# Patient Record
Sex: FEMALE | Race: ASIAN | NOT HISPANIC OR LATINO
[De-identification: names, ages, dates, MRNs, and addresses within clinical notes are randomized per-mention and may not be internally consistent; named-entity substitution may affect disease eponyms.]

---

## 2017-01-17 ENCOUNTER — RESULT REVIEW (OUTPATIENT)
Age: 52
End: 2017-01-17

## 2017-05-15 ENCOUNTER — APPOINTMENT (OUTPATIENT)
Dept: INFECTIOUS DISEASE | Facility: CLINIC | Age: 52
End: 2017-05-15

## 2023-03-11 ENCOUNTER — EMERGENCY (EMERGENCY)
Facility: HOSPITAL | Age: 58
LOS: 1 days | Discharge: ANOTHER HOSPITAL | End: 2023-03-11
Attending: EMERGENCY MEDICINE | Admitting: EMERGENCY MEDICINE
Payer: COMMERCIAL

## 2023-03-11 VITALS
SYSTOLIC BLOOD PRESSURE: 85 MMHG | HEART RATE: 56 BPM | RESPIRATION RATE: 15 BRPM | OXYGEN SATURATION: 100 % | DIASTOLIC BLOOD PRESSURE: 57 MMHG | TEMPERATURE: 98 F

## 2023-03-11 PROCEDURE — 99285 EMERGENCY DEPT VISIT HI MDM: CPT

## 2023-03-11 NOTE — ED ADULT TRIAGE NOTE - CHIEF COMPLAINT QUOTE
presents C/O fall downstairs approximately 5 stairs. + head strike. denies LOC + deformities right wrist. also endorsing pain to right upper quadrant. denies AC use. no Pmhx

## 2023-03-12 ENCOUNTER — INPATIENT (INPATIENT)
Facility: HOSPITAL | Age: 58
LOS: 0 days | Discharge: HOME | DRG: 86 | End: 2023-03-13
Attending: SURGERY | Admitting: SURGERY
Payer: COMMERCIAL

## 2023-03-12 VITALS
DIASTOLIC BLOOD PRESSURE: 64 MMHG | OXYGEN SATURATION: 97 % | HEART RATE: 71 BPM | RESPIRATION RATE: 17 BRPM | SYSTOLIC BLOOD PRESSURE: 104 MMHG

## 2023-03-12 VITALS
WEIGHT: 132.28 LBS | RESPIRATION RATE: 16 BRPM | HEIGHT: 65 IN | HEART RATE: 66 BPM | TEMPERATURE: 98 F | DIASTOLIC BLOOD PRESSURE: 55 MMHG | SYSTOLIC BLOOD PRESSURE: 105 MMHG | OXYGEN SATURATION: 98 %

## 2023-03-12 DIAGNOSIS — S42.309A UNSPECIFIED FRACTURE OF SHAFT OF HUMERUS, UNSPECIFIED ARM, INITIAL ENCOUNTER FOR CLOSED FRACTURE: ICD-10-CM

## 2023-03-12 LAB
ALBUMIN SERPL ELPH-MCNC: 3.8 G/DL — SIGNIFICANT CHANGE UP (ref 3.3–5)
ALP SERPL-CCNC: 56 U/L — SIGNIFICANT CHANGE UP (ref 40–120)
ALT FLD-CCNC: 16 U/L — SIGNIFICANT CHANGE UP (ref 4–33)
ANION GAP SERPL CALC-SCNC: 16 MMOL/L — HIGH (ref 7–14)
ANION GAP SERPL CALC-SCNC: 9 MMOL/L — SIGNIFICANT CHANGE UP (ref 5–17)
APPEARANCE UR: CLEAR — SIGNIFICANT CHANGE UP
APTT BLD: 24.4 SEC — LOW (ref 27–36.3)
AST SERPL-CCNC: 24 U/L — SIGNIFICANT CHANGE UP (ref 4–32)
BACTERIA # UR AUTO: NEGATIVE — SIGNIFICANT CHANGE UP
BASE EXCESS BLDV CALC-SCNC: -3.6 MMOL/L — LOW (ref -2–3)
BASOPHILS # BLD AUTO: 0.04 K/UL — SIGNIFICANT CHANGE UP (ref 0–0.2)
BASOPHILS NFR BLD AUTO: 0.3 % — SIGNIFICANT CHANGE UP (ref 0–2)
BILIRUB SERPL-MCNC: 0.2 MG/DL — SIGNIFICANT CHANGE UP (ref 0.2–1.2)
BILIRUB UR-MCNC: NEGATIVE — SIGNIFICANT CHANGE UP
BLD GP AB SCN SERPL QL: NEGATIVE — SIGNIFICANT CHANGE UP
BLOOD GAS VENOUS COMPREHENSIVE RESULT: SIGNIFICANT CHANGE UP
BUN SERPL-MCNC: 13 MG/DL — SIGNIFICANT CHANGE UP (ref 7–23)
BUN SERPL-MCNC: 8 MG/DL — SIGNIFICANT CHANGE UP (ref 7–23)
CALCIUM SERPL-MCNC: 8.4 MG/DL — SIGNIFICANT CHANGE UP (ref 8.4–10.5)
CALCIUM SERPL-MCNC: 8.8 MG/DL — SIGNIFICANT CHANGE UP (ref 8.4–10.5)
CHLORIDE BLDV-SCNC: 107 MMOL/L — SIGNIFICANT CHANGE UP (ref 96–108)
CHLORIDE SERPL-SCNC: 107 MMOL/L — SIGNIFICANT CHANGE UP (ref 98–107)
CHLORIDE SERPL-SCNC: 108 MMOL/L — SIGNIFICANT CHANGE UP (ref 96–108)
CK SERPL-CCNC: 201 U/L — HIGH (ref 25–170)
CO2 BLDV-SCNC: 24.1 MMOL/L — SIGNIFICANT CHANGE UP (ref 22–26)
CO2 SERPL-SCNC: 17 MMOL/L — LOW (ref 22–31)
CO2 SERPL-SCNC: 23 MMOL/L — SIGNIFICANT CHANGE UP (ref 22–31)
COLOR SPEC: SIGNIFICANT CHANGE UP
CREAT SERPL-MCNC: 0.62 MG/DL — SIGNIFICANT CHANGE UP (ref 0.5–1.3)
CREAT SERPL-MCNC: 0.66 MG/DL — SIGNIFICANT CHANGE UP (ref 0.5–1.3)
DIFF PNL FLD: NEGATIVE — SIGNIFICANT CHANGE UP
EGFR: 103 ML/MIN/1.73M2 — SIGNIFICANT CHANGE UP
EGFR: 104 ML/MIN/1.73M2 — SIGNIFICANT CHANGE UP
EOSINOPHIL # BLD AUTO: 0.05 K/UL — SIGNIFICANT CHANGE UP (ref 0–0.5)
EOSINOPHIL NFR BLD AUTO: 0.4 % — SIGNIFICANT CHANGE UP (ref 0–6)
EPI CELLS # UR: 2 /HPF — SIGNIFICANT CHANGE UP
FLUAV AG NPH QL: SIGNIFICANT CHANGE UP
FLUBV AG NPH QL: SIGNIFICANT CHANGE UP
GAS PNL BLDV: 137 MMOL/L — SIGNIFICANT CHANGE UP (ref 136–145)
GLUCOSE BLDV-MCNC: 131 MG/DL — HIGH (ref 70–99)
GLUCOSE SERPL-MCNC: 146 MG/DL — HIGH (ref 70–99)
GLUCOSE SERPL-MCNC: 94 MG/DL — SIGNIFICANT CHANGE UP (ref 70–99)
GLUCOSE UR QL: NEGATIVE — SIGNIFICANT CHANGE UP
HCG SERPL-ACNC: <5 MIU/ML — SIGNIFICANT CHANGE UP
HCO3 BLDV-SCNC: 23 MMOL/L — SIGNIFICANT CHANGE UP (ref 22–29)
HCT VFR BLD CALC: 32.7 % — LOW (ref 34.5–45)
HCT VFR BLD CALC: 37.2 % — SIGNIFICANT CHANGE UP (ref 34.5–45)
HCT VFR BLDA CALC: 34 % — LOW (ref 34.5–46.5)
HGB BLD CALC-MCNC: 11.2 G/DL — LOW (ref 11.7–16.1)
HGB BLD-MCNC: 10.3 G/DL — LOW (ref 11.5–15.5)
HGB BLD-MCNC: 11.1 G/DL — LOW (ref 11.5–15.5)
HYALINE CASTS # UR AUTO: 3 /LPF — HIGH (ref 0–2)
IANC: 9.69 K/UL — HIGH (ref 1.8–7.4)
IMM GRANULOCYTES NFR BLD AUTO: 0.4 % — SIGNIFICANT CHANGE UP (ref 0–0.9)
INR BLD: 1.06 RATIO — SIGNIFICANT CHANGE UP (ref 0.88–1.16)
KETONES UR-MCNC: NEGATIVE — SIGNIFICANT CHANGE UP
LACTATE BLDV-MCNC: 2.2 MMOL/L — HIGH (ref 0.5–2)
LEUKOCYTE ESTERASE UR-ACNC: NEGATIVE — SIGNIFICANT CHANGE UP
LIDOCAIN IGE QN: 47 U/L — SIGNIFICANT CHANGE UP (ref 7–60)
LYMPHOCYTES # BLD AUTO: 1.37 K/UL — SIGNIFICANT CHANGE UP (ref 1–3.3)
LYMPHOCYTES # BLD AUTO: 11.5 % — LOW (ref 13–44)
MAGNESIUM SERPL-MCNC: 2 MG/DL — SIGNIFICANT CHANGE UP (ref 1.6–2.6)
MCHC RBC-ENTMCNC: 24.9 PG — LOW (ref 27–34)
MCHC RBC-ENTMCNC: 26.4 PG — LOW (ref 27–34)
MCHC RBC-ENTMCNC: 29.8 GM/DL — LOW (ref 32–36)
MCHC RBC-ENTMCNC: 31.5 GM/DL — LOW (ref 32–36)
MCV RBC AUTO: 83.6 FL — SIGNIFICANT CHANGE UP (ref 80–100)
MCV RBC AUTO: 83.8 FL — SIGNIFICANT CHANGE UP (ref 80–100)
MONOCYTES # BLD AUTO: 0.68 K/UL — SIGNIFICANT CHANGE UP (ref 0–0.9)
MONOCYTES NFR BLD AUTO: 5.7 % — SIGNIFICANT CHANGE UP (ref 2–14)
NEUTROPHILS # BLD AUTO: 9.69 K/UL — HIGH (ref 1.8–7.4)
NEUTROPHILS NFR BLD AUTO: 81.7 % — HIGH (ref 43–77)
NITRITE UR-MCNC: NEGATIVE — SIGNIFICANT CHANGE UP
NRBC # BLD: 0 /100 WBCS — SIGNIFICANT CHANGE UP (ref 0–0)
NRBC # BLD: 0 /100 WBCS — SIGNIFICANT CHANGE UP (ref 0–0)
NRBC # FLD: 0 K/UL — SIGNIFICANT CHANGE UP (ref 0–0)
PCO2 BLDV: 45 MMHG — SIGNIFICANT CHANGE UP (ref 39–52)
PH BLDV: 7.31 — LOW (ref 7.32–7.43)
PH UR: 6.5 — SIGNIFICANT CHANGE UP (ref 5–8)
PHOSPHATE SERPL-MCNC: 3.1 MG/DL — SIGNIFICANT CHANGE UP (ref 2.5–4.5)
PLATELET # BLD AUTO: 275 K/UL — SIGNIFICANT CHANGE UP (ref 150–400)
PLATELET # BLD AUTO: 333 K/UL — SIGNIFICANT CHANGE UP (ref 150–400)
PO2 BLDV: 37 MMHG — SIGNIFICANT CHANGE UP (ref 25–45)
POTASSIUM BLDV-SCNC: 4 MMOL/L — SIGNIFICANT CHANGE UP (ref 3.5–5.1)
POTASSIUM SERPL-MCNC: 3.8 MMOL/L — SIGNIFICANT CHANGE UP (ref 3.5–5.3)
POTASSIUM SERPL-MCNC: 3.9 MMOL/L — SIGNIFICANT CHANGE UP (ref 3.5–5.3)
POTASSIUM SERPL-SCNC: 3.8 MMOL/L — SIGNIFICANT CHANGE UP (ref 3.5–5.3)
POTASSIUM SERPL-SCNC: 3.9 MMOL/L — SIGNIFICANT CHANGE UP (ref 3.5–5.3)
PROT SERPL-MCNC: 6.9 G/DL — SIGNIFICANT CHANGE UP (ref 6–8.3)
PROT UR-MCNC: ABNORMAL
PROTHROM AB SERPL-ACNC: 12.3 SEC — SIGNIFICANT CHANGE UP (ref 10.5–13.4)
RBC # BLD: 3.9 M/UL — SIGNIFICANT CHANGE UP (ref 3.8–5.2)
RBC # BLD: 4.45 M/UL — SIGNIFICANT CHANGE UP (ref 3.8–5.2)
RBC # FLD: 13.5 % — SIGNIFICANT CHANGE UP (ref 10.3–14.5)
RBC # FLD: 13.7 % — SIGNIFICANT CHANGE UP (ref 10.3–14.5)
RBC CASTS # UR COMP ASSIST: 2 /HPF — SIGNIFICANT CHANGE UP (ref 0–4)
RH IG SCN BLD-IMP: NEGATIVE — SIGNIFICANT CHANGE UP
RSV RNA NPH QL NAA+NON-PROBE: SIGNIFICANT CHANGE UP
SAO2 % BLDV: 65.6 % — LOW (ref 67–88)
SARS-COV-2 RNA SPEC QL NAA+PROBE: SIGNIFICANT CHANGE UP
SODIUM SERPL-SCNC: 140 MMOL/L — SIGNIFICANT CHANGE UP (ref 135–145)
SODIUM SERPL-SCNC: 140 MMOL/L — SIGNIFICANT CHANGE UP (ref 135–145)
SP GR SPEC: 1.05 — HIGH (ref 1.01–1.02)
TROPONIN T, HIGH SENSITIVITY RESULT: <6 NG/L — SIGNIFICANT CHANGE UP (ref 0–51)
UROBILINOGEN FLD QL: NEGATIVE — SIGNIFICANT CHANGE UP
WBC # BLD: 11.88 K/UL — HIGH (ref 3.8–10.5)
WBC # BLD: 7 K/UL — SIGNIFICANT CHANGE UP (ref 3.8–10.5)
WBC # FLD AUTO: 11.88 K/UL — HIGH (ref 3.8–10.5)
WBC # FLD AUTO: 7 K/UL — SIGNIFICANT CHANGE UP (ref 3.8–10.5)
WBC UR QL: 2 /HPF — SIGNIFICANT CHANGE UP (ref 0–5)

## 2023-03-12 PROCEDURE — 70450 CT HEAD/BRAIN W/O DYE: CPT | Mod: 26

## 2023-03-12 PROCEDURE — 73090 X-RAY EXAM OF FOREARM: CPT | Mod: 26,RT

## 2023-03-12 PROCEDURE — 70450 CT HEAD/BRAIN W/O DYE: CPT | Mod: 26,MA,77

## 2023-03-12 PROCEDURE — 72125 CT NECK SPINE W/O DYE: CPT | Mod: 26,MA

## 2023-03-12 PROCEDURE — 71045 X-RAY EXAM CHEST 1 VIEW: CPT | Mod: 26

## 2023-03-12 PROCEDURE — 73090 X-RAY EXAM OF FOREARM: CPT | Mod: 26,RT,77,76

## 2023-03-12 PROCEDURE — 74177 CT ABD & PELVIS W/CONTRAST: CPT | Mod: 26,MA

## 2023-03-12 PROCEDURE — 76377 3D RENDER W/INTRP POSTPROCES: CPT | Mod: 26

## 2023-03-12 PROCEDURE — 73030 X-RAY EXAM OF SHOULDER: CPT | Mod: 26,RT

## 2023-03-12 PROCEDURE — 99222 1ST HOSP IP/OBS MODERATE 55: CPT

## 2023-03-12 PROCEDURE — 70486 CT MAXILLOFACIAL W/O DYE: CPT | Mod: 26,MA

## 2023-03-12 PROCEDURE — 99282 EMERGENCY DEPT VISIT SF MDM: CPT

## 2023-03-12 PROCEDURE — 73110 X-RAY EXAM OF WRIST: CPT | Mod: 26,RT

## 2023-03-12 PROCEDURE — 71260 CT THORAX DX C+: CPT | Mod: 26,MA

## 2023-03-12 PROCEDURE — 73080 X-RAY EXAM OF ELBOW: CPT | Mod: 26,RT

## 2023-03-12 PROCEDURE — 99291 CRITICAL CARE FIRST HOUR: CPT | Mod: 25

## 2023-03-12 PROCEDURE — 69000 DRG XTRNL EAR ABSC/HEM SMPL: CPT

## 2023-03-12 RX ORDER — ACETAMINOPHEN 500 MG
975 TABLET ORAL EVERY 6 HOURS
Refills: 0 | Status: DISCONTINUED | OUTPATIENT
Start: 2023-03-12 | End: 2023-03-13

## 2023-03-12 RX ORDER — MORPHINE SULFATE 50 MG/1
4 CAPSULE, EXTENDED RELEASE ORAL ONCE
Refills: 0 | Status: DISCONTINUED | OUTPATIENT
Start: 2023-03-12 | End: 2023-03-12

## 2023-03-12 RX ORDER — SODIUM CHLORIDE 9 MG/ML
1000 INJECTION INTRAMUSCULAR; INTRAVENOUS; SUBCUTANEOUS ONCE
Refills: 0 | Status: COMPLETED | OUTPATIENT
Start: 2023-03-12 | End: 2023-03-12

## 2023-03-12 RX ORDER — HYDROMORPHONE HYDROCHLORIDE 2 MG/ML
0.25 INJECTION INTRAMUSCULAR; INTRAVENOUS; SUBCUTANEOUS EVERY 4 HOURS
Refills: 0 | Status: DISCONTINUED | OUTPATIENT
Start: 2023-03-12 | End: 2023-03-13

## 2023-03-12 RX ORDER — LIDOCAINE 4 G/100G
1 CREAM TOPICAL EVERY 24 HOURS
Refills: 0 | Status: DISCONTINUED | OUTPATIENT
Start: 2023-03-12 | End: 2023-03-13

## 2023-03-12 RX ORDER — OXYCODONE HYDROCHLORIDE 5 MG/1
5 TABLET ORAL EVERY 4 HOURS
Refills: 0 | Status: DISCONTINUED | OUTPATIENT
Start: 2023-03-12 | End: 2023-03-13

## 2023-03-12 RX ORDER — ACETAMINOPHEN 500 MG
1000 TABLET ORAL ONCE
Refills: 0 | Status: COMPLETED | OUTPATIENT
Start: 2023-03-12 | End: 2023-03-12

## 2023-03-12 RX ORDER — TRAZODONE HCL 50 MG
50 TABLET ORAL AT BEDTIME
Refills: 0 | Status: DISCONTINUED | OUTPATIENT
Start: 2023-03-12 | End: 2023-03-13

## 2023-03-12 RX ORDER — OXYCODONE HYDROCHLORIDE 5 MG/1
2.5 TABLET ORAL EVERY 4 HOURS
Refills: 0 | Status: DISCONTINUED | OUTPATIENT
Start: 2023-03-12 | End: 2023-03-13

## 2023-03-12 RX ORDER — SENNA PLUS 8.6 MG/1
2 TABLET ORAL AT BEDTIME
Refills: 0 | Status: DISCONTINUED | OUTPATIENT
Start: 2023-03-12 | End: 2023-03-13

## 2023-03-12 RX ORDER — POLYETHYLENE GLYCOL 3350 17 G/17G
17 POWDER, FOR SOLUTION ORAL EVERY 24 HOURS
Refills: 0 | Status: DISCONTINUED | OUTPATIENT
Start: 2023-03-12 | End: 2023-03-13

## 2023-03-12 RX ORDER — LIDOCAINE 4 G/100G
1 CREAM TOPICAL ONCE
Refills: 0 | Status: COMPLETED | OUTPATIENT
Start: 2023-03-12 | End: 2023-03-12

## 2023-03-12 RX ADMIN — OXYCODONE HYDROCHLORIDE 5 MILLIGRAM(S): 5 TABLET ORAL at 18:39

## 2023-03-12 RX ADMIN — OXYCODONE HYDROCHLORIDE 5 MILLIGRAM(S): 5 TABLET ORAL at 17:39

## 2023-03-12 RX ADMIN — SODIUM CHLORIDE 1000 MILLILITER(S): 9 INJECTION INTRAMUSCULAR; INTRAVENOUS; SUBCUTANEOUS at 10:26

## 2023-03-12 RX ADMIN — Medication 975 MILLIGRAM(S): at 18:39

## 2023-03-12 RX ADMIN — HYDROMORPHONE HYDROCHLORIDE 0.25 MILLIGRAM(S): 2 INJECTION INTRAMUSCULAR; INTRAVENOUS; SUBCUTANEOUS at 14:13

## 2023-03-12 RX ADMIN — MORPHINE SULFATE 4 MILLIGRAM(S): 50 CAPSULE, EXTENDED RELEASE ORAL at 06:08

## 2023-03-12 RX ADMIN — OXYCODONE HYDROCHLORIDE 5 MILLIGRAM(S): 5 TABLET ORAL at 21:58

## 2023-03-12 RX ADMIN — OXYCODONE HYDROCHLORIDE 5 MILLIGRAM(S): 5 TABLET ORAL at 22:58

## 2023-03-12 RX ADMIN — MORPHINE SULFATE 4 MILLIGRAM(S): 50 CAPSULE, EXTENDED RELEASE ORAL at 01:55

## 2023-03-12 RX ADMIN — LIDOCAINE 1 PATCH: 4 CREAM TOPICAL at 10:25

## 2023-03-12 RX ADMIN — Medication 400 MILLIGRAM(S): at 06:09

## 2023-03-12 RX ADMIN — Medication 50 MILLIGRAM(S): at 21:58

## 2023-03-12 RX ADMIN — HYDROMORPHONE HYDROCHLORIDE 0.25 MILLIGRAM(S): 2 INJECTION INTRAMUSCULAR; INTRAVENOUS; SUBCUTANEOUS at 14:28

## 2023-03-12 RX ADMIN — Medication 1000 MILLIGRAM(S): at 06:39

## 2023-03-12 RX ADMIN — Medication 975 MILLIGRAM(S): at 23:28

## 2023-03-12 RX ADMIN — Medication 975 MILLIGRAM(S): at 17:38

## 2023-03-12 RX ADMIN — LIDOCAINE 1 PATCH: 4 CREAM TOPICAL at 22:00

## 2023-03-12 RX ADMIN — Medication 1000 MILLIGRAM(S): at 07:33

## 2023-03-12 RX ADMIN — SENNA PLUS 2 TABLET(S): 8.6 TABLET ORAL at 17:38

## 2023-03-12 RX ADMIN — LIDOCAINE 1 PATCH: 4 CREAM TOPICAL at 19:30

## 2023-03-12 RX ADMIN — POLYETHYLENE GLYCOL 3350 17 GRAM(S): 17 POWDER, FOR SOLUTION ORAL at 17:40

## 2023-03-12 RX ADMIN — MORPHINE SULFATE 4 MILLIGRAM(S): 50 CAPSULE, EXTENDED RELEASE ORAL at 06:20

## 2023-03-12 NOTE — PHYSICAL THERAPY INITIAL EVALUATION ADULT - ACTIVE RANGE OF MOTION EXAMINATION, REHAB EVAL
R UE unable to formally assess due to pain, fingers WFL/Left UE Active ROM was WFL (within functional limits)/bilateral  lower extremity Active ROM was WFL (within functional limits)

## 2023-03-12 NOTE — ED PROVIDER NOTE - OBJECTIVE STATEMENT
57F presenting after fall down 5 stairs at home. Pt endorsing head trauma, pain to the right temporal area. PT also noting swelling and pain to the right wrist, and severe pain to the right lateral chest wall. Pt without significant pmh, is not on AC. Denying n/v, visual changes, weakness/numbness.

## 2023-03-12 NOTE — H&P ADULT - HISTORY OF PRESENT ILLNESS
57F no significant pmh presenting as tranasfer from [facility] after fall at home.    Patient reports she was walking downstairs and lost her footing at home, fella bout 5 steps on to her head/right side/wrist. She was down for seconds when she regained consciousness and was helped up by her . She denies any prodromal symptoms or losing consciousness prior to fall and remembers the episode. Due to pain and swelling in her right wrist she went with her  to the ED. No prior episodes of fall, does not take anticoagulation medications.    On arrival, primary intact, GCS 15, secondary significant for right posterior auricular/temporal tenderness and bruising, right wrist casted, right posterior chest wall tenderness, right hip tenderness. Trauma consulted for further evaluation and management.

## 2023-03-12 NOTE — ED ADULT TRIAGE NOTE - PRO INTERPRETER NEED 2
----- Message from Sarina Greco MD sent at 10/12/2017  4:19 PM CDT -----  Hey,    Sorry I didn't do this sooner.  I put an addendum in the pathology report now.  There are no fungal organisms.  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  No inflammation.  Nothing that looks like actinomyces which usually looks like cotton balls.  Maybe they were just present previously as normal oral contaminant?    Sarina  ----- Message -----  From: Bernard Daley MD  Sent: 9/25/2017   8:15 AM  To: Sarina Greco MD    Understood. I'd appreciate it. Thanks for help.  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  I am at [facility] the beginning half of this week so cannot pull the case right now but I will order a GMS stain to check for fungal organisms and issue an addendum.      Thanks,  Sarina Greco  ----- Message -----  From: Bernard Daley MD  Sent: 9/25/2017   7:59 AM  To: Sarina Greco MD    I just wanted to clarify, since the referring MD had some question here regarding laryngeal actinomycosis or some other infectious process based on prior biopsy/cultures: no evidence of any bacteria/fungi on your analysis, correct?  Thanks  JM        
English

## 2023-03-12 NOTE — ED ADULT NURSE NOTE - OBJECTIVE STATEMENT
56 y/o F no pertinent PMH presented to ED, transfer from [facility], s/p fall. Pt states she missed a step in her house and fell down a flight of stairs, positive head strike and LOC, denies anticoagulants. Per EMS, pt has rib fractures and right wrist fracture which was reduced at [facility]. Pt endorsing wrist and back pain, headaches at this time. Neuro in tact, full sensation in RUE, denies numbness or tingling in all extremities, distal pulses present and equal bilaterally. PERRL, strength equal in lower extremities and left upper extremity, weakness in side of fracture. A&Ox3, breathing spontaneously and unlabored, speaking full sentences, moving extremities. Appropriate safety measures in place, call bell within reach, family at bedside, pt is on CM. 56 y/o F no pertinent PMH presented to ED, transfer from [facility], s/p fall. Pt states she missed a step in her house and fell down a flight of stairs, positive head strike and LOC, denies anticoagulants. Per EMS, pt has rib fractures and right wrist fracture which was reduced at [facility]. Pt endorsing wrist and back pain, headaches at this time. Neuro in tact, full sensation in RUE, denies numbness or tingling in all extremities, distal pulses present and equal bilaterally. PERRL, strength equal in lower extremities and left upper extremity, weakness in side of fracture. Pt denies cp, sob, abd pain, vision changes at this time. A&Ox3, breathing spontaneously and unlabored, speaking full sentences, moving extremities. Appropriate safety measures in place, call bell within reach, family at bedside, pt is on CM.

## 2023-03-12 NOTE — H&P ADULT - ASSESSMENT
57F s/p fall from 5 steps with the following injuries, rib score 1 and 500 on IS:    - Right sphenoid fx with 2-3mm extra-axial hemorrhage  - R 4-6 minimally displaced rib fx  - Right gluteal hematoma  - Right distal radius fx s/p splinting by orthopedics    Plan:  - Admit to Dr. Correia, dispo pending repeat head CT  - Repeat head CT with max face to further characterize fracture and assess stability of bleed  - Neurosurgery consulted for management of intracranial hemorrhage, sphenoid fx  - Rib fx - protocol tylenol, oxy/dilaudid prn, lidocaine patch, holding NSAIDs in setting of bleed  - regular diet, bowel regimen  - home trazodone 50mg   - PT/OT for right wrist fx and ambulation after rib fx  - dvt ppx held in setting of bleed, to start 24 hr after stability  - report incidental finding of fibroids  - Tertiary on 3/13    Plan discussed with Dr. Masood Delgado  ATP  [id number]     57F s/p fall from 5 steps with the following injuries, rib score 1 and 500 on IS:    - Right sphenoid fx with 2-3mm extra-axial hemorrhage  - Non displaced right zygomatic fx  - R 4-6 minimally displaced rib fx  - Right gluteal hematoma  - Right distal radius fx s/p splinting by orthopedics    Plan:  - Admit to Dr. Correia, dispo pending repeat head CT  - Repeat head CT with max face to further characterize fracture and assess stability of bleed  - Neurosurgery consulted for management of intracranial hemorrhage, sphenoid fx, appreciate recs  - Face called for zygomatic fx, await call back  - Rib fx - protocol tylenol, oxy/dilaudid prn, lidocaine patch, holding NSAIDs in setting of bleed, may start tomorrow if head CT stable  - regular diet, bowel regimen  - home trazodone 50mg   - PT/OT for right wrist fx and ambulation after rib fx  - dvt ppx held in setting of bleed, to start 24 hr after stability 12 hours from initial CT  - report incidental finding of fibroids  - Tertiary on 3/13    Plan discussed with Dr. Masood Rojas [place]  ATP  [id number]

## 2023-03-12 NOTE — CONSULT NOTE ADULT - SUBJECTIVE AND OBJECTIVE BOX
57F no AC/AP, txfr from [facility] s/p mechanical fall downstairs last night with HS, LOC, found to have a small R sphenoid fx and reported tiny associated focus of heme. Also found to have rib fractures and R distal radial fx. Plts and coags are WNL.    --Anticoagulation--    T(C): 37.1 (03-12-23 @ 09:22), Max: 37.1 (03-12-23 @ 09:22)  HR: 72 (03-12-23 @ 09:22) (66 - 72)  BP: 112/72 (03-12-23 @ 09:22) (105/55 - 112/72)  RR: 18 (03-12-23 @ 09:22) (16 - 18)  SpO2: 100% (03-12-23 @ 09:22) (98% - 100%)  Wt(kg): --    Exam:  R arm in cast, otherwise neurointact (AO3, PERRL, EOMI, GOLDEN 5/5, SILT), no csf leak  
COLTON COHEN 57y  MRN-[id number]    Patient is a 57y Female who presents to [facility] ED s/p fall     HPI:   57y Female with no past medical history presents to [facility] ED after falling down 5 stairs OMFS was consulted for zygomatic fractures noted on CT.     PMH: Denies     MEDICATIONS  (STANDING):  acetaminophen     Tablet .. 975 milliGRAM(s) Oral every 6 hours  lidocaine   4% Patch 1 Patch Transdermal every 24 hours  polyethylene glycol 3350 17 Gram(s) Oral every 24 hours  senna 2 Tablet(s) Oral at bedtime  traZODone 50 milliGRAM(s) Oral at bedtime    MEDICATIONS  (PRN):  HYDROmorphone  Injectable 0.25 milliGRAM(s) IV Push every 4 hours PRN Severe Pain (7 - 10)  oxyCODONE    IR 2.5 milliGRAM(s) Oral every 4 hours PRN Mild Pain (1 - 3)  oxyCODONE    IR 5 milliGRAM(s) Oral every 4 hours PRN Moderate Pain (4 - 6)    PSH: Denies     Allergies NKDA    SOCIAL HISTORY:   ETOH use: Denies   Tobacco use:  Denies   Recreational drug use: Denies       Review of Systems: Patient denies loss of consciousness, fever, chills, nausea, vomiting, headache, CP, SOB, cough, palpitations, blurred vision/double vision, dysphagia, dyspnea.      Physical Exam:   Gen: AAOx3, NAD   Head: Ecchymosis / tenderness to palpation on right ear, no abrasions, no lacerations    Eyes: EOMI, PERRL, visual acuity intact, no diplopia, supra/infra orbital rims intact, no subconjunctival heme, no telecanthus, no exophthalmos   Ears: Gross hearing intact, no heme appreciated, condylar head palpated bilaterally, no otorrhea    Nose: No septal hematoma/asymmetry, no epistaxis bilaterally, no rhinorrhea, no abrasions present, no lacerations,  Malar: No malar depression, no CN V-2 paresthesia   Throat: No LAD, supple, trachea midline  Extraoral/Intraoral Exam: MAHAMED: 30, dentition grossly intact, occlusion is stable and reproducible, no CN V-3 paresthesia, no mobility of maxilla/crepitis, TMJ: No clicking/popping     Vitals:   Vital Signs Last 24 Hrs  T(C): 37 (12 Mar 2023 15:34), Max: 37.1 (12 Mar 2023 09:22)  T(F): 98.6 (12 Mar 2023 15:34), Max: 98.7 (12 Mar 2023 09:22)  HR: 76 (12 Mar 2023 15:34) (66 - 79)  BP: 122/69 (12 Mar 2023 15:34) (105/55 - 132/81)  BP(mean): --  RR: 18 (12 Mar 2023 15:34) (16 - 18)  SpO2: 97% (12 Mar 2023 15:34) (97% - 100%)    Parameters below as of 12 Mar 2023 15:34  Patient On (Oxygen Delivery Method): room air                          10.3   7.00  )-----------( 275      ( 12 Mar 2023 12:48 )             32.7     CT Maxillofacial:  FINDINGS:  CT head without IV contrast  BRAIN and CSF SPACES: The brain demonstrates no abnormal attenuation. Previously noted extra-axial hemorrhage in the right middle cranial fossa is not convincingly seen on the current examination. Cerebral cortical gray-white matter differentiation is maintained. No acute cerebral cortical infarct is seen. No brain parenchymal hemorrhage is found. No mass effect is found in the brain.  The ventricles, sulci and basal cisterns appear unremarkable. No differential cerebral cortical atrophy is recognized.  HEAD AND NECK STRUCTURES: The facial structures are reported belowcavity appears intact. The nasopharynx is symmetric. The central skull base is intact. The temporal bones demonstrate patent petrous air cells.  2. CT facial bones without IV contrast  Facial bones are significant for slight cortical buckling of the right calvarium adjacent to the sphenoid squamous temporal suture. Slight outward buckling is noted on series 7 image 82 and slight inward buckling on image 87. The cortical offset is only slight. No pre-existing or destructive bone lesion is found. There is also a nondisplaced fracture of the overlying right zygomatic arch.  The superficial soft tissues appear grossly intact. Orbital pre septal structures appear intact. No radiopaque foreign body is seen. The deep facial spaces are intact  The paranasal sinuses are clear. No abnormal paranasal sinus fluid collection is found. No osseous erosion or expansion is present.  The nasal septum deviates left-to-right causing stenosis at the right naris The ostiomeatal complexes appear normally formed.  The orbits demonstrate intact post septal structures. The globes are intact. Intraconal and extraconal fat is preserved. The extraocular muscles remain symmetric. The superior ophthalmic veins are symmetric caliber.  IMPRESSION:  1. CT HEAD: No acute intracranial injury is appreciated on the current examination. Small focus of extra-axial hemorrhage right middle cranial fossa seen on prior CT earlier same date is not convincingly demonstrated on the current examination.  2. CT FACIAL: Minimally displaced fracture right calvarium near the sphenoid squamous suture. Nondisplaced zygomatic arch fracture. These findings are unchanged from earlier same day.

## 2023-03-12 NOTE — ED PROVIDER NOTE - CARE PLAN
Principal Discharge DX:	Wrist fracture  Secondary Diagnosis:	Chest wall pain  Secondary Diagnosis:	Headache   1 Principal Discharge DX:	Calvarial fracture  Secondary Diagnosis:	Chest wall pain  Secondary Diagnosis:	Headache  Secondary Diagnosis:	Wrist fracture  Secondary Diagnosis:	Multiple fractures of ribs of right side

## 2023-03-12 NOTE — ED PROVIDER NOTE - CLINICAL SUMMARY MEDICAL DECISION MAKING FREE TEXT BOX
57F presenting after fall down 5 stairs at home, pain to right temporal head, right wrist, right chest wall. C-spine without midline tenderness. VS unremarkable. Plan to cth cspine, chest, ap. Pain control. Pre-op labs. Possible trauma consult 57F presenting after fall down 5 stairs at home, pain to right temporal head, right wrist, right chest wall. C-spine without midline tenderness. VS unremarkable. Plan to cth cspine, chest, ap. Pain control. Pre-op labs.

## 2023-03-12 NOTE — CHART NOTE - NSCHARTNOTEFT_GEN_A_CORE
Discussed with ortho, who saw patient at [facility]. Per ortho, okay to FU with Dr. Stone this week on discharge, as long as within one week.    Bob Delgado  ATP  [id number] Discussed with ortho, who saw patient at [facility]. Per ortho, okay to FU with Dr. Stone this week on discharge, as long as within one week.    Recs:  - Strict Ice/Elevation  - NWB RUE with splint and sling  - Keep splint clean/dry/intact;  - Encourage active finger motion to help with swelling    Bob Delgado  ATP  [id number]

## 2023-03-12 NOTE — ED PROVIDER NOTE - OBJECTIVE STATEMENT
58 yo F with no pertinent PMH, presenting as transfer for trauma evaluation following fall down stairs. Patient reports trip and fall down ~5 stairs onto the R side of her head with brief loss of consciousness. Per chart review, patient found to have R sphenoid bone fracture with associated 2-3 mm blood, R 4th-6th rib fracture, and R distal radius fracture (reduced and splinted by ortho prior to transfer). Patient reports pain is well controlled currently. Denies n/v, changes in vision, focal weakness, shortness of breath.

## 2023-03-12 NOTE — PHYSICAL THERAPY INITIAL EVALUATION ADULT - ADDITIONAL COMMENTS
Head CT 3/12/23: Right sphenoid fx with 2-3mm extra-axial hemorrhage. Non displaced right zygomatic fx  CT Abdomen & Pelvis 3/12/23: Right 4-6 minimally displaced rib fx Right gluteal hematoma  CT R Wrist/Hand 3/12/23: Right distal radius fx s/p splinting by orthopedics; NWB R UE in splint/sling per MD orders Pt states she lives with  in multi level home with steps to enter with rail, staircase inside with rail, she states she was fully independent functionally and with ADL's prior.

## 2023-03-12 NOTE — CONSULT NOTE ADULT - ASSESSMENT
Lacey Goodrich  57F no AC/AP, txfr from [facility] s/p mechanical fall downstairs last night with HS, LOC, found to have a small R sphenoid fx and reported tiny associated focus of heme. Also found to have rib fractures and R distal radial fx. Plts and coags are WNL. Exam: R arm in cast, otherwise neurointact, no csf leak    -no acute neurosurgical intervention  -admit to trauma surgery for polytrauma  -5 hr repeat stable; recommend outpatient FU with Dr. Cleary in 1-2 wks from DC  -If plan to start dvt ppx, can start 24hrs after at least 12 hr stability scan  -no keppra necessary

## 2023-03-12 NOTE — PHYSICAL THERAPY INITIAL EVALUATION ADULT - LIVES WITH, PROFILE
spouse Pt resides in private home with spouse and children, +no steps to enter through garage, 5:5 steps inside with HR assist. Pt reports being functionally independent in all aspects of mobility and self care without AD. (+) drives/spouse

## 2023-03-12 NOTE — PHYSICAL THERAPY INITIAL EVALUATION ADULT - BED MOBILITY TRAINING, PT EVAL
GOAL: Pt will perform all bedmobility independently within 3-4 wks. GOAL: Pt will perform all bed mobility with CGA within 3-4 wks.

## 2023-03-12 NOTE — PHYSICAL THERAPY INITIAL EVALUATION ADULT - LEVEL OF INDEPENDENCE: SIT/STAND, REHAB EVAL
Pt notes dizziness with BP drop and significant pain R flank/UE deferring transfers at this time/unable to perform requiring increased time to complete secondary to pain./minimum assist (75% patients effort)

## 2023-03-12 NOTE — CONSULT NOTE ADULT - ASSESSMENT
57F presenting after fall down 5 stairs at home. Pt endorsing head trauma, pain to the right temporal area. PT also noting swelling and pain to the right wrist, and severe pain to the right lateral chest wall. Pt without significant pmh, is not on AC. Denying n/v, visual changes, weakness/numbness.  CT head with right sphenoid wing fracture , tiny extraaxial focus of blood in r frontal  area 57F presenting after fall down 5 stairs at home. Pt endorsing head trauma, pain to the right temporal area. PT also noting swelling and pain to the right wrist, and severe pain to the right lateral chest wall. Pt without significant pmh, is not on AC. Denying n/v, visual changes, weakness/numbness.  CT head with right sphenoid wing fracture , tiny extraaxial focus of blood in r frontal  area      - No acute neurosurgical intervention for small hyperdensity or fracture  - Repeat CT head without contrast in 6 hours for stability  - To be transferred to [facility] for trauma eval  - Dr. Cleary aware

## 2023-03-12 NOTE — PHYSICAL THERAPY INITIAL EVALUATION ADULT - GAIT TRAINING, PT EVAL
GOAL: Pt will ambulate 150' independently with or without AD as needed in 3-4wks. GOAL: Pt will ambulate 150' with supervision with or without AD as needed in 3-4wks.

## 2023-03-12 NOTE — CONSULT NOTE ADULT - SUBJECTIVE AND OBJECTIVE BOX
· HPI Objective Statement: 57F presenting after fall down 5 stairs at home. Pt endorsing head trauma, pain to the right temporal area. PT also noting swelling and pain to the right wrist, and severe pain to the right lateral chest wall. Pt without significant pmh, is not on AC. Denying n/v, visual changes, weakness/numbness.      PAST MEDICAL & SURGICAL HISTORY:  No pertinent past medical history      No significant past surgical history        FAMILY HISTORY:    Home Medications:      MEDICATIONS  (STANDING):    MEDICATIONS  (PRN):    Vital Signs Last 24 Hrs  T(C): 36.7 (12 Mar 2023 05:58), Max: 36.7 (12 Mar 2023 01:55)  T(F): 98.1 (12 Mar 2023 05:58), Max: 98.1 (12 Mar 2023 01:55)  HR: 79 (12 Mar 2023 05:58) (56 - 79)  BP: 105/57 (12 Mar 2023 05:58) (85/57 - 105/57)  BP(mean): 71 (12 Mar 2023 05:58) (71 - 71)  RR: 18 (12 Mar 2023 05:58) (15 - 18)  SpO2: 100% (12 Mar 2023 05:58) (99% - 100%)    Parameters below as of 12 Mar 2023 01:55  Patient On (Oxygen Delivery Method): room air        PHYSICAL EXAM:  Awake Alert Oriented x 3 No distress, Speech is clear  PERRL, EOMI, Tongue midline, No facial drop  Motor:             RUE 5/5        LUE 5/5             RLE 5/5         LLE 5/5  Sensory intac to light touch  No dysmetria  No drift    LABS:    PT/INR - ( 12 Mar 2023 01:49 )   PT: 12.3 sec;   INR: 1.06 ratio         PTT - ( 12 Mar 2023 01:49 )  PTT:24.4 sec                        11.1   11.88 )-----------( 333      ( 12 Mar 2023 01:49 )             37.2     03-12    140  |  107  |  13  ----------------------------<  146<H>  3.9   |  17<L>  |  0.66    Ca    8.8      12 Mar 2023 01:49    TPro  6.9  /  Alb  3.8  /  TBili  0.2  /  DBili  x   /  AST  24  /  ALT  16  /  AlkPhos  56  03-12      RADIOLOGY:  < from: CT Head No Cont (03.12.23 @ 05:36) >    FINDINGS:  Brain parenchyma/extra-axial compartments: No large territory infarct.   Tiny extra-axial focus of blood products, 2 to 3 mm in axial thickness,   suspected at the right frontal mid convexity (series 302, image 36). No   significant mass effect. The ventricles and sulci are normal in size and   configuration for patient's stated age. The basal cisterns are patent.   Craniocervical junction and sella turcica are within normal limits.    Calvarium, paranasal sinuses, and orbits: There is probable diastases of   the right zygomaticotemporal suture with fracture present involving the   subjacent sphenoid wing (series 307, image51) (series 303, image 25).   Subcutaneous epidermal inclusion cysts are present throughout the   posterior parietal and occipital scalp. Paranasal sinuses and mastoid air   cells are clear. The orbits are within normal limits.        < end of copied text >         · HPI Objective Statement: 57F presenting after fall down 5 stairs at home. Pt endorsing head trauma, pain to the right temporal area. PT also noting swelling and pain to the right wrist, and severe pain to the right lateral chest wall. Pt without significant pmh, is not on AC. Denying n/v, visual changes, weakness/numbness.      PAST MEDICAL & SURGICAL HISTORY:  No pertinent past medical history      No significant past surgical history        FAMILY HISTORY:    Home Medications:      MEDICATIONS  (STANDING):    MEDICATIONS  (PRN):    Vital Signs Last 24 Hrs  T(C): 36.7 (12 Mar 2023 05:58), Max: 36.7 (12 Mar 2023 01:55)  T(F): 98.1 (12 Mar 2023 05:58), Max: 98.1 (12 Mar 2023 01:55)  HR: 79 (12 Mar 2023 05:58) (56 - 79)  BP: 105/57 (12 Mar 2023 05:58) (85/57 - 105/57)  BP(mean): 71 (12 Mar 2023 05:58) (71 - 71)  RR: 18 (12 Mar 2023 05:58) (15 - 18)  SpO2: 100% (12 Mar 2023 05:58) (99% - 100%)    Parameters below as of 12 Mar 2023 01:55  Patient On (Oxygen Delivery Method): room air        PHYSICAL EXAM:  Awake Alert Oriented x 3 No distress, Speech is clear  PERRL, EOMI, Tongue midline, No facial drop  Motor:             RUE 5/5        LUE casted            RLE 5/5         LLE 5/5  Sensory intac to light touch  No dysmetria  + R rib pain  No evidence of CSF leak on exam, denies salty or metallic taste  No drift    LABS:    PT/INR - ( 12 Mar 2023 01:49 )   PT: 12.3 sec;   INR: 1.06 ratio         PTT - ( 12 Mar 2023 01:49 )  PTT:24.4 sec                        11.1   11.88 )-----------( 333      ( 12 Mar 2023 01:49 )             37.2     03-12    140  |  107  |  13  ----------------------------<  146<H>  3.9   |  17<L>  |  0.66    Ca    8.8      12 Mar 2023 01:49    TPro  6.9  /  Alb  3.8  /  TBili  0.2  /  DBili  x   /  AST  24  /  ALT  16  /  AlkPhos  56  03-12      RADIOLOGY:  < from: CT Head No Cont (03.12.23 @ 05:36) >    FINDINGS:  Brain parenchyma/extra-axial compartments: No large territory infarct.   Tiny extra-axial focus of blood products, 2 to 3 mm in axial thickness,   suspected at the right frontal mid convexity (series 302, image 36). No   significant mass effect. The ventricles and sulci are normal in size and   configuration for patient's stated age. The basal cisterns are patent.   Craniocervical junction and sella turcica are within normal limits.    Calvarium, paranasal sinuses, and orbits: There is probable diastases of   the right zygomaticotemporal suture with fracture present involving the   subjacent sphenoid wing (series 307, image51) (series 303, image 25).   Subcutaneous epidermal inclusion cysts are present throughout the   posterior parietal and occipital scalp. Paranasal sinuses and mastoid air   cells are clear. The orbits are within normal limits.        < end of copied text >

## 2023-03-12 NOTE — ED ADULT NURSE NOTE - OBJECTIVE STATEMENT
presented to the ED s/p fall from 4-5 stairs. reports hitting her head, denies any LOC, complaining of right arm pain, right rib pain, right swollen upon assessment  denies any nausea or vomiting

## 2023-03-12 NOTE — CONSULT NOTE ADULT - SUBJECTIVE AND OBJECTIVE BOX
57yFemale R Hand Dominant presents to [facility] ED c/o severe R wrist pain s/p mechanical fall. Pt walking down stairs and slipped falling 5 steps + headstrike. Possible LOC as pt confused aftwerward Localizing pain to distal radius. Denies radiation of pain. Pt denies numbness, tingling or burning.  Patient denies any other injuries.    PMH:  No pertinent past medical history      PSH:  No significant past surgical history      AH:    Meds: See med rec    T(C): 36.7 (03-12-23 @ 05:58)  HR: 79 (03-12-23 @ 05:58)  BP: 105/57 (03-12-23 @ 05:58)  RR: 18 (03-12-23 @ 05:58)  SpO2: 100% (03-12-23 @ 05:58)  Wt(kg): --    PE R UE:  Skin intact, visible deformity of wrist,   + soft tissue swelling, no ecchymosis;   Decreased ROM of Wrist 2/2 pain.   Normal Elbow/Shoulder ROM w/o pain.   + TTP over DR/Ulna. + Rad Pulse 2+/4.   SILT C5-T1,   +AIN/PIN/Ulnar/Radial/Musc/Median,   soft compartments;    L UE / B/L LE:  No bony TTP; Good ROM w/o pain. Able to SLR B/L. Exam Unremarkable.     Imaging:  XRay R Wrist  3 views of R Wrist demonstrates R distal radius fracture,    .hblock    Procedure Note:  After verbal consent obtained, ~ 10 cc of 1% Lidocaine injected into area around DR/Ulna as hematoma block. UE hung by fingers and reduction maneuver performed. Sugartong splint applied to Forearm/Wrist and mold held. LA XR obtained which show improved alignment of R DR Fracture. Pt NVI post procedure. Pt tolerated procedure well.    A/P: 57yFemale s/p Mech Fall w/ R distal Radius Fracture  - Pain control  - Strict Ice/Elevation  - NWB RUE with splint and sling  - Keep splint clean/dry/intact;  - Encourage active finger motion to help with swelling  - Pt aware of possible need for surgical intervention of distal radius fracture. Will FU as outpatient  - Pt made aware of signs and symptoms of compartment syndrome. Aware of need to contact Doctor / Return to ED if symtoms arise.0  - All Pt's / Family Members questions answered, Pt/family understand plan.  - NESS w/ Dr. Stone in 2-3 days.

## 2023-03-12 NOTE — H&P ADULT - ATTENDING COMMENTS
ATTENDING ATTESTATION  I have seen and examined this patient with the resident housestaff. I have reviewed all labs, imaging and reports. I have participated in formulating the plan, and have read and agree with the history, ROS, exam, assessment and plan as stated above.     Ortho for distal radius fracture (splinted), call to Face Team for sphenoid/zygomatic arch fx (likely NTD), ACS admission for pain control optimization and PT/OT. Using IS well.    Total time spent in the care of this patient today (excluding critical care, teaching & procedures): 55 min                Over 50% of the total time was spent on counseling and coordination of care.     Annette Correia M.D., M.S.  Division of Acute Care Surgery

## 2023-03-12 NOTE — ED PROVIDER NOTE - PHYSICAL EXAMINATION
PHYSICAL EXAM:  CONSTITUTIONAL: uncomfortable appearing   HEAD: ttp to right sided temporal area   EYES: Clear bilaterally, pupils equal, round and reactive to light.  ENMT: Airway patent, Nasal mucosa clear. Mouth with normal mucosa. Uvula is midline.   CARDIAC: Normal rate, regular rhythm. +S1/S2. No murmurs, rubs or gallops.  CHEST: exquisite ttp to lateral right sided chest wall, no ecchymosis   RIGHT ARM: ROM of right wrist restricted by pain, edema present, exquisite tenderness to palpation  RESPIRATORY: Breathing unlabored. Breath sounds clear and equal bilaterally.  ABDOMEN:  Soft, nontender, nondistended. No rebound tenderness or guarding.  NEUROLOGICAL: Alert and oriented, no focal deficits, no motor or sensory deficits. CN2-12 intact. Sensation intact x4 extremities.  SKIN: Skin warm and dry. No evidence of rashes or lesions. PHYSICAL EXAM:  CONSTITUTIONAL: uncomfortable appearing   HEAD: ttp to right sided temporal area   EYES: Clear bilaterally, pupils equal, round and reactive to light.  ENMT: Airway patent, Nasal mucosa clear. Mouth with normal mucosa. Uvula is midline.   CARDIAC: Normal rate, regular rhythm. +S1/S2. No murmurs, rubs or gallops.  CHEST: exquisite ttp to lateral right sided chest wall, no ecchymosis   RIGHT ARM: ROM of right wrist restricted by pain, edema present, exquisite tenderness to palpation. pulse present   RESPIRATORY: Breathing unlabored. Breath sounds clear and equal bilaterally.  ABDOMEN:  Soft, nontender, nondistended. No rebound tenderness or guarding.  NEUROLOGICAL: Alert and oriented, no focal deficits, no motor or sensory deficits. CN2-12 intact. Sensation intact x4 extremities.  SKIN: Skin warm and dry. No evidence of rashes or lesions.

## 2023-03-12 NOTE — PHYSICAL THERAPY INITIAL EVALUATION ADULT - PERTINENT HX OF CURRENT PROBLEM, REHAB EVAL
56 yo F no significant PMHx presenting as tranasfer from [facility] after fall at home. Patient reports she was walking downstairs and lost her footing at home, fell about 5 steps on to her head/right side/wrist. She was down for seconds when she regained consciousness and was helped up by her . She denies any prodromal symptoms or losing consciousness prior to fall and remembers the episode. Due to pain and swelling in her right wrist she went with her  to the ED. No prior episodes of fall, does not take anticoagulation medications. On arrival, primary intact, GCS 15, secondary significant for right posterior auricular/temporal tenderness and bruising, right wrist casted, right posterior chest wall tenderness, right hip tenderness. Trauma consulted for further evaluation and management.    CT HEAD 3/12/23: No acute intracranial injury is appreciated on the current examination. Small focus of extra-axial hemorrhage right middle cranial fossa seen on prior CT earlier same date is not convincingly   demonstrated on the current examination.  CT FACIAL 3/12/23: Minimally displaced fracture right calvarium near the sphenoid squamous suture. Nondisplaced zygomatic arch fracture. 58 yo F no significant PMHx presenting as tranasfer from [facility] after fall at home. Patient reports she was walking downstairs and lost her footing at home, fell about 5 steps on to her head/right side/wrist. She was down for seconds when she regained consciousness and was helped up by her . She denies any prodromal symptoms or losing consciousness prior to fall and remembers the episode. Due to pain and swelling in her right wrist she went with her  to the ED. No prior episodes of fall, does not take anticoagulation medications. On arrival, primary intact, GCS 15, secondary significant for right posterior auricular/temporal tenderness and bruising, right wrist splinted, right posterior chest wall tenderness, right hip tenderness. Pt found to have a small R sphenoid fx and reported tiny associated focus of heme. Also found to have rib fractures and R distal radial fx. No acute neurosurgical intervention, pt admitted under Trauma surgery for polytrauma.    CT HEAD 3/12/23: No acute intracranial injury is appreciated on the current examination. Small focus of extra-axial hemorrhage right middle cranial fossa seen on prior CT earlier same date is not convincingly   demonstrated on the current examination. Calvarial fractures involving the right sphenoid bone with probable diastases of the right zygomaticotemporal suture. Tiny focus of extra-axial blood products subjacent to the fracture sites measuring 2 to 3 mm in axial thickness. Short interval follow-up CT recommended to assess stability.  CT FACIAL 3/12/23: Minimally displaced fracture right calvarium near the sphenoid squamous suture. Nondisplaced zygomatic arch fracture.  CT CERVICAL SPINE 3/12/23: No cervical spine fracture or traumatic malalignment.  Chest Abdomen & Pelvis 3/12/23 IMPRESSION: Acute minimally displaced right lateral fourth through sixth rib fractures. Right gluteal subcutaneous hematoma.  CT R Wrist/Hand 3/12/23: Right distal radius fx s/p splinting by orthopedics; NWB R UE in splint/sling per MD orders 56 yo F no significant PMHx presenting as transfer from [facility] after fall at home. Patient reports she was walking downstairs and lost her footing at home, fell about 5 steps on to her head/right side/wrist. She was down for seconds when she regained consciousness and was helped up by her . She denies any prodromal symptoms or losing consciousness prior to fall and remembers the episode. Due to pain and swelling in her right wrist she went with her  to the ED. No prior episodes of fall, does not take anticoagulation medications. On arrival, primary intact, GCS 15, secondary significant for right posterior auricular/temporal tenderness and bruising, right wrist splinted, right posterior chest wall tenderness, right hip tenderness. Pt found to have a small R sphenoid fx and reported tiny associated focus of heme. Also found to have rib fractures and R distal radial fx. No acute neurosurgical intervention, pt admitted under Trauma surgery for polytrauma.    CT HEAD 3/12/23: No acute intracranial injury is appreciated on the current examination. Small focus of extra-axial hemorrhage right middle cranial fossa seen on prior CT earlier same date is not convincingly   demonstrated on the current examination. Calvarial fractures involving the right sphenoid bone with probable diastases of the right zygomaticotemporal suture. Tiny focus of extra-axial blood products subjacent to the fracture sites measuring 2 to 3 mm in axial thickness. Short interval follow-up CT recommended to assess stability.  CT FACIAL 3/12/23: Minimally displaced fracture right calvarium near the sphenoid squamous suture. Nondisplaced zygomatic arch fracture.  CT CERVICAL SPINE 3/12/23: No cervical spine fracture or traumatic malalignment.  Chest Abdomen & Pelvis 3/12/23 IMPRESSION: Acute minimally displaced right lateral fourth through sixth rib fractures. Right gluteal subcutaneous hematoma.  CT Right Wrist & Hand 3/12/23: Right distal radius fx s/p splinting by orthopedics; NWB R UE in splint/sling per MD orders

## 2023-03-12 NOTE — PHYSICAL THERAPY INITIAL EVALUATION ADULT - DIAGNOSIS, PT EVAL
Pt presents with R arm and flank pain, impairments in ROM, strength, balance and endurance all impacting ability to perform ADLs and functional mobility

## 2023-03-12 NOTE — ED ADULT TRIAGE NOTE - ESI TRIAGE ACUITY LEVEL, MLM
What Stage Is The Melanoma?: Stage 0
What Is The Reason For Today's Visit?: History of Melanoma
Year Excised?: 2016
2

## 2023-03-12 NOTE — ED ADULT NURSE REASSESSMENT NOTE - NS ED NURSE REASSESS COMMENT FT1
Break coverage RN: Pt A&Ox4, pt /co 10/10 R hip pain and appears uncomfortable. 20g IV placed in L hand. labs sent per order. medication administered per order - per Dr.Majeste narvaez to give morphine despite hypotension. Respirations even and unlabored. Pt denies any chest pain, sob, or dizziness. no acute distress noted. pending CT. pt aware to call for assistance prior to getting up.

## 2023-03-12 NOTE — PHYSICAL THERAPY INITIAL EVALUATION ADULT - PLANNED THERAPY INTERVENTIONS, PT EVAL
STAIR GOAL: Pt will negotiate 1 FOS with HR assist within 3-4 wks./balance training/bed mobility training/gait training/transfer training

## 2023-03-12 NOTE — ED PROVIDER NOTE - ATTENDING CONTRIBUTION TO CARE
57-year-old history of fall down 5 stairs hitting the right side of the face with loss of consciousness.  Patient was reported to have had sphenoid fracture, intracranial hemorrhage proximal to this, right wrist fracture status post splinting by orthopedics at [facility], and rib fractures in the fourth fifth and sixth ribs on the lateral right side.  Patient received morphine prior to transfer around 6 AM.  Patient is not on anticoagulation.  Patient has right ecchymoses with a small underlying hematoma on the antihelix to the triangular fossa which is nearly symmetric to the contralateral area  Otherwise normocephalic  Cranial nerves grossly intact  Trachea midline  Nontachycardic  Nontachypneic  Gokul Polanco MD, FACEP: In this physician's medical judgement based on clinical history and physical exam the patient's signs and symptoms lead to differential diagnoses which includes but is not limited to: Multisystem trauma with ICH, right wrist fracture and rib fractures    Labs were appreciated from outside hospital.  EKG was ordered and reviewed by me.  Imaging was ordered and reviewed by me.  CT head, chest x-ray, forearm x-ray, wrist x-ray from outside hospital were reviewed independently by me.    Appropriate medications for the patient's presenting complaints were ordered, and effects were reassessed.    Patient's records including prior hospital visit, med and medical history were reviewed.    Escalation to admission/observation was considered.    Will follow up on labs, therapeutics, imaging, reassess and disposition as clinically indicated.  *The above represents an initial assessment/impression. Please refer to my progress notes below for potential changes in patient clinical course*    Trauma consulted upon arrival    Neurosurgery consulted upon arrival 57-year-old history of fall down 5 stairs hitting the right side of the face with loss of consciousness.  Patient was reported to have had sphenoid fracture, intracranial hemorrhage proximal to this, right wrist fracture status post splinting by orthopedics at [facility], and rib fractures in the fourth fifth and sixth ribs on the lateral right side.  Patient received morphine prior to transfer around 6 AM.  Patient is not on anticoagulation.  Patient has right ecchymoses with a small underlying hematoma on the antihelix to the triangular fossa which is nearly symmetric to the contralateral area  Otherwise normocephalic  Cranial nerves grossly intact  Trachea midline  Non-tachycardic  Non-tachypneic  Gokul Polanco MD, FACEP: In this physician's medical judgement based on clinical history and physical exam the patient's signs and symptoms lead to differential diagnoses which includes but is not limited to: Multisystem trauma with ICH, right wrist fracture and rib fractures    Labs were appreciated from outside hospital.  EKG was ordered and reviewed by me.  Imaging was ordered and reviewed by me.  CT head, chest x-ray, forearm x-ray, wrist x-ray from outside hospital were reviewed independently by me.    Appropriate medications for the patient's presenting complaints were ordered, and effects were reassessed.    Patient's records including prior hospital visit, med and medical history were reviewed.    Escalation to admission/observation was considered.    Will follow up on labs, therapeutics, imaging, reassess and disposition as clinically indicated.  *The above represents an initial assessment/impression. Please refer to my progress notes below for potential changes in patient clinical course*    Trauma consulted upon arrival    Neurosurgery consulted upon arrival

## 2023-03-12 NOTE — ED PROVIDER NOTE - CLINICAL SUMMARY MEDICAL DECISION MAKING FREE TEXT BOX
57F, no PMH, no AC presenting as transfer for polytrauma. Multiple rib fractures, distal radius fracture, sphenoid fx with small ICH. Trauma, neurosurgery and ortho consulted on arrival. Given incentive spirometry pulling >500cc. Will monitor auricular hematoma with possible drainage. Will repeat CT head now, pain control and admission likely to trauma service.

## 2023-03-12 NOTE — ED PROVIDER NOTE - PHYSICAL EXAMINATION
Primary Survey  A - airway intact  B - bilateral breath sounds and good chest rise  C - initially BP table, palpable pulses in all extremities  D - GCS 15 on arrival    Secondary survey  Gen: NAD  HEENT: NC, +small R auricular hematoma, pupils 3mm equal & reactive  CV: pulse regularly present  Pulm: CTA B/L  Chest: intact without ttp  Abd: Soft, ND, NT, no rebound, no guarding  Groin: Normal appearing  Ext: Palp radial b/l, palp DP b/l, +RUE splint, n/v intact distally

## 2023-03-12 NOTE — CONSULT NOTE ADULT - ASSESSMENT
Assessment and Recommendation:   57y Female s/p fall with right ZMC fracture, right sphenoid fx with 2-3mm extra-axial hemorrhage, non-displaced right zygomatic fx, right 4-6 minimally displaced rib fx, right gluteal hematoma, right distal radius fx s/p splinting by orthopedics.    Plan:  Plan pending discussion with an attending   Assessment and Recommendation:   57y Female s/p fall with right ZMC fracture, right sphenoid fx with 2-3mm extra-axial hemorrhage, non-displaced right zygomatic fx, right 4-6 minimally displaced rib fx, right gluteal hematoma, right distal radius fx s/p splinting by orthopedics.    Plan:  - No acute intervention indicated from an OMFS perspective   - Pain control per primary  - Patient should follow up as an outpatient at [facility] in one week.     [facility]   Address: [address] [place] [address]  Phone: [phone]    Virgil Love DDS   Oral and Maxillofacial Surgery   Pager #[phone]  Available on Microsoft Teams

## 2023-03-12 NOTE — ED PROVIDER NOTE - PROGRESS NOTE DETAILS
MD CHO:  I received call from radiology, pt has calvarial fx on r with ich, recommend repeat ct in 4-6 hours.  Will consult neurosurg.      Ortho aware of r wrist fx, will consult. MD CHO:  Pt has 3 rib fx on ct chest.  Will xfer to [facility] for trauma care.  Spoke with transfer center, spoke with neurotrauma Dr. Hinton, requests ED to ED xfer for trauma care.  Spoke with [facility] ED, xfer accepted by Dr. Streeter.  Splint placed by ortho for wrist fx. Naomy Ervin PGY1: Pt signed out. Pt reassessed and endorses minimal headache. Pt denies HA, vision changes, numbness/tingling or weakness. Pt informed of transfer to [facility]. Pt accepted for transfer to [facility] ED.

## 2023-03-12 NOTE — ED PROVIDER NOTE - ATTENDING CONTRIBUTION TO CARE
DR. CHANDLER, ATTENDING MD-  I performed a face to face bedside interview with the patient regarding history of present illness, review of symptoms and past medical history. I completed an independent physical exam.  I have discussed the patient's plan of care with the resident.   Documentation as above in the note.    57-year-old female status post mechanical fall down 5 stairs with complaint of headache right wrist pain right rib pain.  Not anticoagulated.  No numbness tingling or weakness.  No neck pain.  Right wrist deformity distally neurovascularly intact.  Evaluate for wrist fracture dislocation rib fracture pneumothorax intracranial hemorrhage.  Obtain CBC BMP coags CT head chest abdomen pelvis chest x-ray x-ray wrist give pain medication and reassess.

## 2023-03-12 NOTE — ED PROVIDER NOTE - NS ED ROS FT
Constitutional:  (-) fever, (-) chills, (-) fatigue  Eyes:  (-) eye pain (-) visual changes  ENMT: (-) nasal discharge, (-) sore throat. (-) neck pain or stiffness  Cardiac: (-) chest pain (-) palpitations  Respiratory:  (-) cough (-) shortness of breath  GI:  (-) nausea (-) vomiting (-) diarrhea (-) abdominal pain  :  (-) dysuria (-) frequency (-) burning  MS: (+) wrist pain (-) back pain (-) joint pain  Neuro:  (-) headache (-) numbness (-) tingling (-) focal weakness  Skin:  (-) rash  Except as documented in the HPI,  all other systems are negative

## 2023-03-12 NOTE — ED PROVIDER NOTE - PROGRESS NOTE DETAILS
Benjamin, PGY3: Auricular hematoma drained with 22g needle with ~0.5 cc output. Pressure dressing placed. Patient endorsed to Dr. Correia's resident/service at the time of admission. Based on patient's history and physical exam, as well as the results of today's workup, I feel that patient warrants admission to the hospital for further workup/evaluation and continued management. I discussed the findings of today's workup with the patient and addressed the patient's questions and concerns. The patient was agreeable with admission. Our team spoke with the inpatient receiving team who accepted the patient for admission and subsequently took over the patient's care at the time of admission. The receiving team will follow up on pending labs, analgesia, any clinical imaging results, ancillary findings, reassess, and disposition as clinically indicated. Details of patient and plan conveyed to receiving physician team and conveyed back for understanding. There were no questions at this time about the patient's status, disposition, and plan. Patient's care to be taken over by receiving physician team at this time, all decisions regarding the progression of care will be made at their discretion.

## 2023-03-12 NOTE — H&P ADULT - NSHPPHYSICALEXAM_GEN_ALL_CORE
Vital Signs Last 24 Hrs  T(C): 37.1 (12 Mar 2023 09:22), Max: 37.1 (12 Mar 2023 09:22)  T(F): 98.7 (12 Mar 2023 09:22), Max: 98.7 (12 Mar 2023 09:22)  HR: 72 (12 Mar 2023 09:22) (66 - 72)  BP: 112/72 (12 Mar 2023 09:22) (105/55 - 112/72)  BP(mean): --  RR: 18 (12 Mar 2023 09:22) (16 - 18)  SpO2: 100% (12 Mar 2023 09:22) (98% - 100%)    Parameters below as of 12 Mar 2023 09:22  Patient On (Oxygen Delivery Method): room air    PHYSICAL EXAM:  General:A&Ox3, resting comfortably  HEENT: Right periauricular bruising and tenderness, EOMI, PERRL. CN II-XII intact  Neck: Soft, midline trachea, nontender to palpation  throughout spine. ROM intact  Chest: R posterior chest wall tenderness  Abdomen: Soft, non-distended, non-tender; no rebound or guarding  Ext: nontender to palpation throughout LUE, bilateral LE, strength 5/5 bilaterally in the upper/lower extremities. No abrasions.   Back: nontender in midline, no palpable runoff/stepoff/deformity  Vascular: Pulses palpable left radial, bilateral dp, right arm casted but with good cap refill

## 2023-03-12 NOTE — PHYSICAL THERAPY INITIAL EVALUATION ADULT - TRANSFER TRAINING, PT EVAL
GOAL: Pt will perform all functional transfers independently with or without AD as needed within 3-4 wks. GOAL: Pt will perform all functional transfers with SBA with or without AD as needed within 3-4 wks.

## 2023-03-12 NOTE — ED POST DISCHARGE NOTE - DETAILS
call again by reportedly pt  asking where patient is.  he denies calling previously.  I directed him to call NS

## 2023-03-12 NOTE — ED POST DISCHARGE NOTE - REASON FOR FOLLOW-UP
Patient's  called asking where wife is. According to ED provider note patient was TX to [facility]. S/W Dr Wright who agreed to speak with  to explain why patient was transferred Dr Wright # [phone] gave  # so he can speak with MD. Other

## 2023-03-12 NOTE — ED PROVIDER NOTE - CARE PLAN
1 Principal Discharge DX:	Multiple fractures of upper extremity and ribs  Secondary Diagnosis:	Fracture of sphenoid bone  Secondary Diagnosis:	Distal radius fracture, right   Principal Discharge DX:	Multiple fractures of upper extremity and ribs  Secondary Diagnosis:	Fracture of sphenoid bone  Secondary Diagnosis:	Distal radius fracture, right  Secondary Diagnosis:	Hematoma of right auricular region

## 2023-03-12 NOTE — PHYSICAL THERAPY INITIAL EVALUATION ADULT - IMPAIRMENTS FOUND, PT EVAL
aerobic capacity/endurance/gait, locomotion, and balance aerobic capacity/endurance/gait, locomotion, and balance/muscle strength/ROM

## 2023-03-13 ENCOUNTER — TRANSCRIPTION ENCOUNTER (OUTPATIENT)
Age: 58
End: 2023-03-13

## 2023-03-13 VITALS
HEART RATE: 80 BPM | SYSTOLIC BLOOD PRESSURE: 120 MMHG | RESPIRATION RATE: 18 BRPM | OXYGEN SATURATION: 96 % | TEMPERATURE: 98 F | DIASTOLIC BLOOD PRESSURE: 76 MMHG

## 2023-03-13 LAB
ANION GAP SERPL CALC-SCNC: 10 MMOL/L — SIGNIFICANT CHANGE UP (ref 5–17)
BUN SERPL-MCNC: 7 MG/DL — SIGNIFICANT CHANGE UP (ref 7–23)
CALCIUM SERPL-MCNC: 8.6 MG/DL — SIGNIFICANT CHANGE UP (ref 8.4–10.5)
CHLORIDE SERPL-SCNC: 102 MMOL/L — SIGNIFICANT CHANGE UP (ref 96–108)
CO2 SERPL-SCNC: 24 MMOL/L — SIGNIFICANT CHANGE UP (ref 22–31)
CREAT SERPL-MCNC: 0.54 MG/DL — SIGNIFICANT CHANGE UP (ref 0.5–1.3)
EGFR: 108 ML/MIN/1.73M2 — SIGNIFICANT CHANGE UP
GLUCOSE SERPL-MCNC: 102 MG/DL — HIGH (ref 70–99)
HCT VFR BLD CALC: 34.7 % — SIGNIFICANT CHANGE UP (ref 34.5–45)
HGB BLD-MCNC: 11.1 G/DL — LOW (ref 11.5–15.5)
MAGNESIUM SERPL-MCNC: 2 MG/DL — SIGNIFICANT CHANGE UP (ref 1.6–2.6)
MCHC RBC-ENTMCNC: 27.1 PG — SIGNIFICANT CHANGE UP (ref 27–34)
MCHC RBC-ENTMCNC: 32 GM/DL — SIGNIFICANT CHANGE UP (ref 32–36)
MCV RBC AUTO: 84.6 FL — SIGNIFICANT CHANGE UP (ref 80–100)
NRBC # BLD: 0 /100 WBCS — SIGNIFICANT CHANGE UP (ref 0–0)
PHOSPHATE SERPL-MCNC: 2.4 MG/DL — LOW (ref 2.5–4.5)
PLATELET # BLD AUTO: 281 K/UL — SIGNIFICANT CHANGE UP (ref 150–400)
POTASSIUM SERPL-MCNC: 3.5 MMOL/L — SIGNIFICANT CHANGE UP (ref 3.5–5.3)
POTASSIUM SERPL-SCNC: 3.5 MMOL/L — SIGNIFICANT CHANGE UP (ref 3.5–5.3)
RBC # BLD: 4.1 M/UL — SIGNIFICANT CHANGE UP (ref 3.8–5.2)
RBC # FLD: 13.8 % — SIGNIFICANT CHANGE UP (ref 10.3–14.5)
SODIUM SERPL-SCNC: 136 MMOL/L — SIGNIFICANT CHANGE UP (ref 135–145)
WBC # BLD: 5 K/UL — SIGNIFICANT CHANGE UP (ref 3.8–10.5)
WBC # FLD AUTO: 5 K/UL — SIGNIFICANT CHANGE UP (ref 3.8–10.5)

## 2023-03-13 PROCEDURE — 81001 URINALYSIS AUTO W/SCOPE: CPT

## 2023-03-13 PROCEDURE — 85027 COMPLETE CBC AUTOMATED: CPT

## 2023-03-13 PROCEDURE — 36415 COLL VENOUS BLD VENIPUNCTURE: CPT

## 2023-03-13 PROCEDURE — 73030 X-RAY EXAM OF SHOULDER: CPT

## 2023-03-13 PROCEDURE — 70450 CT HEAD/BRAIN W/O DYE: CPT

## 2023-03-13 PROCEDURE — 84100 ASSAY OF PHOSPHORUS: CPT

## 2023-03-13 PROCEDURE — 97162 PT EVAL MOD COMPLEX 30 MIN: CPT

## 2023-03-13 PROCEDURE — 99285 EMERGENCY DEPT VISIT HI MDM: CPT | Mod: 25

## 2023-03-13 PROCEDURE — 76377 3D RENDER W/INTRP POSTPROCES: CPT

## 2023-03-13 PROCEDURE — 80048 BASIC METABOLIC PNL TOTAL CA: CPT

## 2023-03-13 PROCEDURE — 96374 THER/PROPH/DIAG INJ IV PUSH: CPT

## 2023-03-13 PROCEDURE — 70486 CT MAXILLOFACIAL W/O DYE: CPT | Mod: MA

## 2023-03-13 PROCEDURE — 83735 ASSAY OF MAGNESIUM: CPT

## 2023-03-13 PROCEDURE — 97165 OT EVAL LOW COMPLEX 30 MIN: CPT

## 2023-03-13 PROCEDURE — 84484 ASSAY OF TROPONIN QUANT: CPT

## 2023-03-13 RX ORDER — OXYCODONE HYDROCHLORIDE 5 MG/1
1 TABLET ORAL
Qty: 5 | Refills: 0
Start: 2023-03-13

## 2023-03-13 RX ORDER — SODIUM,POTASSIUM PHOSPHATES 278-250MG
1 POWDER IN PACKET (EA) ORAL ONCE
Refills: 0 | Status: COMPLETED | OUTPATIENT
Start: 2023-03-13 | End: 2023-03-13

## 2023-03-13 RX ORDER — ACETAMINOPHEN 500 MG
3 TABLET ORAL
Qty: 0 | Refills: 0 | DISCHARGE
Start: 2023-03-13

## 2023-03-13 RX ORDER — POTASSIUM PHOSPHATE, MONOBASIC POTASSIUM PHOSPHATE, DIBASIC 236; 224 MG/ML; MG/ML
30 INJECTION, SOLUTION INTRAVENOUS ONCE
Refills: 0 | Status: COMPLETED | OUTPATIENT
Start: 2023-03-13 | End: 2023-03-13

## 2023-03-13 RX ADMIN — Medication 975 MILLIGRAM(S): at 17:30

## 2023-03-13 RX ADMIN — OXYCODONE HYDROCHLORIDE 5 MILLIGRAM(S): 5 TABLET ORAL at 10:00

## 2023-03-13 RX ADMIN — Medication 975 MILLIGRAM(S): at 18:26

## 2023-03-13 RX ADMIN — OXYCODONE HYDROCHLORIDE 5 MILLIGRAM(S): 5 TABLET ORAL at 14:58

## 2023-03-13 RX ADMIN — OXYCODONE HYDROCHLORIDE 5 MILLIGRAM(S): 5 TABLET ORAL at 09:07

## 2023-03-13 RX ADMIN — LIDOCAINE 1 PATCH: 4 CREAM TOPICAL at 11:49

## 2023-03-13 RX ADMIN — POTASSIUM PHOSPHATE, MONOBASIC POTASSIUM PHOSPHATE, DIBASIC 83.33 MILLIMOLE(S): 236; 224 INJECTION, SOLUTION INTRAVENOUS at 11:50

## 2023-03-13 RX ADMIN — Medication 1 PACKET(S): at 09:08

## 2023-03-13 RX ADMIN — OXYCODONE HYDROCHLORIDE 5 MILLIGRAM(S): 5 TABLET ORAL at 16:00

## 2023-03-13 RX ADMIN — Medication 975 MILLIGRAM(S): at 06:14

## 2023-03-13 RX ADMIN — Medication 975 MILLIGRAM(S): at 16:33

## 2023-03-13 RX ADMIN — Medication 975 MILLIGRAM(S): at 00:28

## 2023-03-13 RX ADMIN — Medication 975 MILLIGRAM(S): at 05:55

## 2023-03-13 NOTE — PROGRESS NOTE ADULT - SUBJECTIVE AND OBJECTIVE BOX
TRAUMA SURGERY DAILY PROGRESS NOTE:     Overnight Events:  Patient passed TOV; urinated 300cc    SUBJECTIVE: Patient seen and evaluated on AM rounds. Pt is resting comfortably in bed with no complaints. Denies chest pain, shortness of breath, nausea, vomitting, diarrhea       OBJECTIVE:  Vital Signs Last 24 Hrs  T(C): 36.6 (13 Mar 2023 04:37), Max: 37.1 (12 Mar 2023 09:22)  T(F): 97.8 (13 Mar 2023 04:37), Max: 98.7 (12 Mar 2023 09:22)  HR: 64 (13 Mar 2023 04:37) (64 - 79)  BP: 124/74 (13 Mar 2023 04:37) (105/55 - 132/81)  BP(mean): --  RR: 18 (13 Mar 2023 04:37) (16 - 18)  SpO2: 96% (13 Mar 2023 04:37) (96% - 100%)    Parameters below as of 13 Mar 2023 04:37  Patient On (Oxygen Delivery Method): room air      I&O's Detail    12 Mar 2023 07:01  -  13 Mar 2023 07:00  --------------------------------------------------------  IN:    Oral Fluid: 400 mL  Total IN: 400 mL    OUT:    Intermittent Catheterization - Urethral (mL): 1400 mL    Voided (mL): 300 mL  Total OUT: 1700 mL    Total NET: -1300 mL    Daily Height in cm: 165.1 (12 Mar 2023 09:07)    Daily       LABS:                        10.3   7.00  )-----------( 275      ( 12 Mar 2023 12:48 )             32.7     03-12    140  |  108  |  8   ----------------------------<  94  3.8   |  23  |  0.62    Ca    8.4      12 Mar 2023 12:48  Phos  3.1     03-12  Mg     2.0     03-12    Urinalysis Basic - ( 12 Mar 2023 18:22 )    Color: Light Yellow / Appearance: Clear / S.054 / pH: x  Gluc: x / Ketone: Negative  / Bili: Negative / Urobili: Negative   Blood: x / Protein: Trace / Nitrite: Negative   Leuk Esterase: Negative / RBC: 2 /hpf / WBC 2 /HPF   Sq Epi: x / Non Sq Epi: 2 /hpf / Bacteria: Negative      PHYSICAL EXAM:  Constitutional: Well developed, well nourished, NAD  Head: Ecchymosis on right ear. No abrasions, no lacerations    Pulmonary: Symmetric chest rise bilaterally, no increased WOB  Gastrointestinal: Abdomen soft, nontender, nondistended.   Extremities: FROM, warm, no LE erythema or edema

## 2023-03-13 NOTE — DISCHARGE NOTE PROVIDER - NSDCCPCAREPLAN_GEN_ALL_CORE_FT
PRINCIPAL DISCHARGE DIAGNOSIS  Diagnosis: Fall  Assessment and Plan of Treatment:       SECONDARY DISCHARGE DIAGNOSES  Diagnosis: Fracture of sphenoid bone  Assessment and Plan of Treatment: Your CT head scans have remained stable.  Please follow up with neurosurgery in 1-2 weeks (Dr. Cleary).    Diagnosis: Distal radius fracture, right  Assessment and Plan of Treatment: Continue keep your forearm in the splint/sling.   Please follow up with Dr. Stone from orthopedics this week.   Your right arm is non-weight bearing at this time.  Please take medication as needed for pain.    Diagnosis: Hematoma of right auricular region  Assessment and Plan of Treatment:     Diagnosis: Fracture, rib  Assessment and Plan of Treatment: Rib fractures take time to heal.   Please continue to take medication for pain.  A prescription for oxycodone was sent to your pharmacy.  Please take as prescribed for severe pain.   Continue to use your incentive spirometer and take deep breaths.   Follow up with your PCP.    Diagnosis: Hematoma  Assessment and Plan of Treatment: Your hemoglobin has remained stable.    Diagnosis: Zygomatic fracture  Assessment and Plan of Treatment: Please take medication as needed for pain.   Please follow up outpatient with OMFS in 1-2 weeks.     PRINCIPAL DISCHARGE DIAGNOSIS  Diagnosis: Fall  Assessment and Plan of Treatment:       SECONDARY DISCHARGE DIAGNOSES  Diagnosis: Fracture of sphenoid bone  Assessment and Plan of Treatment: Your CT head scans have remained stable.  Please follow up with neurosurgery in 1-2 weeks (Dr. Cleary).    Diagnosis: Distal radius fracture, right  Assessment and Plan of Treatment: Continue keep your forearm in the splint/sling.   Please follow up with Dr. Stone from orthopedics this week.   Your right arm is non-weight bearing at this time.  Please take medication as needed for pain.    Diagnosis: Hematoma of right auricular region  Assessment and Plan of Treatment:     Diagnosis: Fracture, rib  Assessment and Plan of Treatment: Rib fractures take time to heal.   Please continue to take medication for pain.  A prescription for oxycodone was sent to your pharmacy.  Please take as prescribed for severe pain.   Continue to use your incentive spirometer and take deep breaths.   Follow up with your PCP.    Diagnosis: Hematoma  Assessment and Plan of Treatment: Your hemoglobin has remained stable.    Diagnosis: Zygomatic fracture  Assessment and Plan of Treatment: Please take medication as needed for pain.   Please follow up outpatient with OMFS in 1-2 weeks.    Diagnosis: Abnormal CT scan  Assessment and Plan of Treatment: You were provided with incidental findings of: Fibroid uterus with scattered foci of myocardial fat.   Please follow up with your gynecologist regarding this finding.

## 2023-03-13 NOTE — DISCHARGE NOTE NURSING/CASE MANAGEMENT/SOCIAL WORK - NSDCPEFALRISK_GEN_ALL_CORE
For information on Fall & Injury Prevention, visit: [email or web address] OR  [email or web address] OR  https://www.cdc.gov/steadi/patient.html

## 2023-03-13 NOTE — OCCUPATIONAL THERAPY INITIAL EVALUATION ADULT - RANGE OF MOTION EXAMINATION, UPPER EXTREMITY
R shoulder limited by pain, casted- elbow>wrist,/Left UE Active ROM was WFL (within functional limits)

## 2023-03-13 NOTE — DISCHARGE NOTE PROVIDER - NSDCMRMEDTOKEN_GEN_ALL_CORE_FT
traZODone 50 mg oral tablet: 1 tab(s) orally once a day (at bedtime)   acetaminophen 325 mg oral tablet: 3 tab(s) orally every 6 hours  Home Physical Therapy: 3-5x week for 4 weeks  Dx: S42.309A  oxyCODONE 5 mg oral tablet: 1 tab(s) orally every 6 hours, As Needed -Moderate Pain (4 - 6) MDD:4  Straight cane: Dx: S42.309A  traZODone 50 mg oral tablet: 1 tab(s) orally once a day (at bedtime)

## 2023-03-13 NOTE — PROGRESS NOTE ADULT - ASSESSMENT
57F s/p fall from 5 steps with the following injuries, rib score 1 and 500 on IS:  - Right sphenoid fx with 2-3mm extra-axial hemorrhage  - Non displaced right zygomatic fx  - R 4-6 minimally displaced rib fx  - Right gluteal hematoma  - Right distal radius fx s/p splinting by orthopedics      PLAN:   - Pain medications PRN  - OOB as tolerated  - Incentive spirometry  - Ortho recs appreciated; NWB RUE with splint and sling  - Neurosx recs appreciated; no acute neurosx int; 5 hr repeat CTH stable  - OMFS recs appreciated; no acute OMFS int   - Start VTE ppx 24 hours after 12 hr stability scan as per neurosx  - PT recs in progress  - Discharge planning pending PT recs      Trauma Surgery  [id number]

## 2023-03-13 NOTE — CHART NOTE - NSCHARTNOTEFT_GEN_A_CORE
Incidental findings are findings on history, physical examination, lab work, and imaging that are not directly related to the patient's chief complaint and cause for hospital admission.     1. The incidental findings for this patient are (please list):  Fibroid uterus with scattered foci of myocardial fat.     2. Were these findings explained to the patient and/or their family (in the event that either the patient requests communication with their family or the patient is unable to understand or remember the findings and plan)?  yes      3. Was a copy of the lab work/ imaging report given to the patient and/or their family?  yes    4. Was the patient asked whether they can follow up with their PMD regarding this finding? If the patient says no, or does not have a PMD, you must identify a provider (i.e. Medicine Clinic or specialist) with whom the patient will follow up.  yes    5. Was the finding documented on the discharge summary?  yes

## 2023-03-13 NOTE — OCCUPATIONAL THERAPY INITIAL EVALUATION ADULT - LIVES WITH, PROFILE
Lives with  in a side split style house with 5 steps to main living area and 5 steps to bed/bath with walk in shower/spouse

## 2023-03-13 NOTE — OCCUPATIONAL THERAPY INITIAL EVALUATION ADULT - ORIENTATION, REHAB EVAL
coughing with yellow sputum x1 week, vomiting with headaches and chest hurting when coughing today. oriented to person, place, time and situation

## 2023-03-13 NOTE — PATIENT PROFILE ADULT - FALL HARM RISK - HARM RISK INTERVENTIONS

## 2023-03-13 NOTE — DISCHARGE NOTE PROVIDER - HOSPITAL COURSE
57F no significant pmh presenting as transfer from [facility] after fall at home.    Patient reports she was walking downstairs and lost her footing at home, fella bout 5 steps on to her head/right side/wrist. She was down for seconds when she regained consciousness and was helped up by her . She denies any prodromal symptoms or losing consciousness prior to fall and remembers the episode. Due to pain and swelling in her right wrist she went with her  to the ED. No prior episodes of fall, does not take anticoagulation medications.    On arrival, primary intact, GCS 15, secondary significant for right posterior auricular/temporal tenderness and bruising, right wrist casted, right posterior chest wall tenderness, right hip tenderness. Trauma consulted for further evaluation and management.    Injuries:   - Right sphenoid fx with 2-3mm extra-axial hemorrhage  - Non displaced right zygomatic fx  - R 4-6 minimally displaced rib fx  - Right gluteal hematoma  - Right distal radius fx s/p splinting by orthopedics    Patient was admitted to Trauma service for further management.  Patient received multi-modal pain control.  Orthopedics was already consulted at [facility]. Right forearm was splinted and placed in sling.  Patient is to be Non-weight beaing to right upper extremity.  Patient to follow up outpatient this week with Dr. Stone.  Neurosurgery was consulted for sphenoid fract with extra-axial hemorrhage.  Repeat CT head remained stable.  No acute surgical intervention indicated.  Patient to follow up with Dr. Cleary in 1-2 weeks.  FS evaluated patient for right zygomatic fracture.  Patient's H/H has remained stable. Recommended outpatient follow up.      Patient was evaluated by physical therapy who recommended **    On day of discharge, patient was tolerating regular diet, pain was controlled, and she was ambulatory.  She is to follow up outpatient with Dr. Cleary, Dr. Stone, [facility] and her PCP. 57F no significant pmh presenting as transfer from [facility] after fall at home.    Patient reports she was walking downstairs and lost her footing at home, fella bout 5 steps on to her head/right side/wrist. She was down for seconds when she regained consciousness and was helped up by her . She denies any prodromal symptoms or losing consciousness prior to fall and remembers the episode. Due to pain and swelling in her right wrist she went with her  to the ED. No prior episodes of fall, does not take anticoagulation medications.    On arrival, primary intact, GCS 15, secondary significant for right posterior auricular/temporal tenderness and bruising, right wrist casted, right posterior chest wall tenderness, right hip tenderness. Trauma consulted for further evaluation and management.    Injuries:   - Right sphenoid fx with 2-3mm extra-axial hemorrhage  - Non displaced right zygomatic fx  - R 4-6 minimally displaced rib fx  - Right gluteal hematoma  - Right distal radius fx s/p splinting by orthopedics    Incidental findings (discussed with pt and pt given a copy of the report):  - Fibroid uterus with scattered foci or myometrial fat.     Patient was admitted to Trauma service for further management.  Patient received multi-modal pain control.  Orthopedics was already consulted at [facility]. Right forearm was splinted and placed in sling.  Patient is to be Non-weight beaing to right upper extremity.  Patient to follow up outpatient this week with Dr. Stone.  Neurosurgery was consulted for sphenoid fract with extra-axial hemorrhage.  Repeat CT head remained stable.  No acute surgical intervention indicated.  Patient to follow up with Dr. Cleary in 1-2 weeks.  OMFS evaluated patient for right zygomatic fracture.  Patient's H/H has remained stable. Recommended outpatient follow up.      Patient was evaluated by physical therapy who recommended **    On day of discharge, patient was tolerating regular diet, pain was controlled, and she was ambulatory.  She is to follow up outpatient with Dr. Cleary, Dr. Stone, [facility] and her PCP. 57F no significant pmh presenting as transfer from [facility] after fall at home.    Patient reports she was walking downstairs and lost her footing at home, fella bout 5 steps on to her head/right side/wrist. She was down for seconds when she regained consciousness and was helped up by her . She denies any prodromal symptoms or losing consciousness prior to fall and remembers the episode. Due to pain and swelling in her right wrist she went with her  to the ED. No prior episodes of fall, does not take anticoagulation medications.    On arrival, primary intact, GCS 15, secondary significant for right posterior auricular/temporal tenderness and bruising, right wrist casted, right posterior chest wall tenderness, right hip tenderness. Trauma consulted for further evaluation and management.    Injuries:   - Right sphenoid fx with 2-3mm extra-axial hemorrhage  - Non displaced right zygomatic fx  - R 4-6 minimally displaced rib fx  - Right gluteal hematoma  - Right distal radius fx s/p splinting by orthopedics    Incidental findings (discussed with pt and pt given a copy of the report):  - Fibroid uterus with scattered foci or myometrial fat.     Patient was admitted to Trauma service for further management.  Patient received multi-modal pain control.  Orthopedics was already consulted at [facility]. Right forearm was splinted and placed in sling.  Patient is to be Non-weight beaing to right upper extremity.  Patient to follow up outpatient this week with Dr. Stone.  Neurosurgery was consulted for sphenoid fract with extra-axial hemorrhage.  Repeat CT head remained stable.  No acute surgical intervention indicated.  Patient to follow up with Dr. Cleary in 1-2 weeks.  OMFS evaluated patient for right zygomatic fracture.  Patient's H/H has remained stable. Recommended outpatient follow up.      Patient was evaluated by physical therapy who recommended **    On day of discharge, patient was tolerating regular diet, pain was controlled, and she was ambulatory.  She is to follow up outpatient with Dr. Cleary, Dr. Stone, [facility] and her PCP.    Incidentals Provided to Patient:  Fibroid uterus with scattered foci of myocardial fat. 57F no significant pmh presenting as transfer from [facility] after fall at home.    Patient reports she was walking downstairs and lost her footing at home, fella bout 5 steps on to her head/right side/wrist. She was down for seconds when she regained consciousness and was helped up by her . She denies any prodromal symptoms or losing consciousness prior to fall and remembers the episode. Due to pain and swelling in her right wrist she went with her  to the ED. No prior episodes of fall, does not take anticoagulation medications.    On arrival, primary intact, GCS 15, secondary significant for right posterior auricular/temporal tenderness and bruising, right wrist casted, right posterior chest wall tenderness, right hip tenderness. Trauma consulted for further evaluation and management.    Injuries:   - Right sphenoid fx with 2-3mm extra-axial hemorrhage  - Non displaced right zygomatic fx  - R 4-6 minimally displaced rib fx  - Right gluteal hematoma  - Right distal radius fx s/p splinting by orthopedics    Incidental findings (discussed with pt and pt given a copy of the report):  - Fibroid uterus with scattered foci or myometrial fat.     Patient was admitted to Trauma service for further management.  Patient received multi-modal pain control.    Orthopedics was already consulted at [facility]. Right forearm was splinted and placed in sling.  Patient is to be Non-weight beaing to right upper extremity.  Patient to follow up outpatient this week with Dr. Stone.    Neurosurgery was consulted for sphenoid fract with extra-axial hemorrhage.  Repeat CT head remained stable.  No acute surgical intervention indicated.  Patient to follow up with Dr. Cleary in 1-2 weeks.    OMFS evaluated patient for right zygomatic fracture.  Patient's H/H has remained stable. Recommended outpatient follow up.      Patient was evaluated by physical therapy who recommended home PT, and occupational therapy who recommended home OT    On day of discharge, patient was tolerating regular diet, pain was controlled, and she was ambulatory.  She is to follow up outpatient with Dr. Cleary, Dr. Stone, [facility] and her PCP.    Incidentals Provided to Patient:  Fibroid uterus with scattered foci of myocardial fat.

## 2023-03-13 NOTE — OCCUPATIONAL THERAPY INITIAL EVALUATION ADULT - PERTINENT HX OF CURRENT PROBLEM, REHAB EVAL
57F no significant pmh presenting as tranasfer from [facility] after fall at home.Patient reports she was walking downstairs and lost her footing at home, fell about 5 steps on to her head/right side/wrist. She was down for seconds when she regained consciousness and was helped up by her . She denies any prodromal symptoms or losing consciousness prior to fall and remembers the episode. Due to pain and swelling in her right wrist she went with her  to the ED. On arrival, primary intact, GCS 15, secondary significant for right posterior auricular/temporal tenderness and bruising, right wrist casted, right posterior chest wall tenderness, right hip tenderness. Trauma consulted for further evaluation and management. Pt found to have R 4-6 minimally displaced rib fx, Right gluteal hematoma, Right distal radius fx s/p splinting by orthopedics      XR R shoulder(-) CT HEAD:No acute intracranial injury is appreciated on the current examination.   Small focus of extra-axial hemorrhage right middle cranial fossa CT FACIAL:  Minimally displaced fracture right calvarium near the sphenoid squamous suture. Nondisplaced zygomatic arch fracture.

## 2023-03-13 NOTE — CHART NOTE - NSCHARTNOTEFT_GEN_A_CORE
TERTIARY TRAUMA SURVEY  ------------------------------------------------------------------------------------    Date of TTS: 3/13/23  Time: 10:00 am  Admit Date:  3/12/23  Admit GCS: 15    HPI:  57F no significant pmh presenting as tranasfer from [facility] after fall at home.    Patient reports she was walking downstairs and lost her footing at home, fella bout 5 steps on to her head/right side/wrist. She was down for seconds when she regained consciousness and was helped up by her . She denies any prodromal symptoms or losing consciousness prior to fall and remembers the episode. Due to pain and swelling in her right wrist she went with her  to the ED. No prior episodes of fall, does not take anticoagulation medications.    On arrival, primary intact, GCS 15, secondary significant for right posterior auricular/temporal tenderness and bruising, right wrist casted, right posterior chest wall tenderness, right hip tenderness. Trauma consulted for further evaluation and management.   (12 Mar 2023 10:51)    Interval events:  Admitted to [facility] surgery, 7 tower    PAST MEDICAL & SURGICAL HISTORY:  No pertinent past medical history  No pertinent past medical history  No significant past surgical history  No significant past surgical history    FAMILY HISTORY:      ALLERGIES: No Known Allergies    CURRENT MEDICATIONS  acetaminophen     Tablet .. 975 milliGRAM(s) Oral every 6 hours  HYDROmorphone  Injectable 0.25 milliGRAM(s) IV Push every 4 hours PRN  lidocaine   4% Patch 1 Patch Transdermal every 24 hours  oxyCODONE    IR 2.5 milliGRAM(s) Oral every 4 hours PRN  oxyCODONE    IR 5 milliGRAM(s) Oral every 4 hours PRN  polyethylene glycol 3350 17 Gram(s) Oral every 24 hours  potassium phosphate IVPB 30 milliMole(s) IV Intermittent once  senna 2 Tablet(s) Oral at bedtime  traZODone 50 milliGRAM(s) Oral at bedtime    -----------------------------------------------------------------------------------    VITAL SIGNS:  T(C): 36.6 (03-13-23 @ 08:33), Max: 37 (03-12-23 @ 11:40)  HR: 73 (03-13-23 @ 09:10) (64 - 79)  BP: 119/75 (03-13-23 @ 09:10)  RR: 18 (03-13-23 @ 08:33) (16 - 18)  SpO2: 98% (03-13-23 @ 08:33) (96% - 99%)    03-12-23 @ 07:01  -  03-13-23 @ 07:00  --------------------------------------------------------  IN: 400 mL / OUT: 1700 mL / NET: -1300 mL    03-13-23 @ 07:01  -  03-13-23 @ 09:47  --------------------------------------------------------  IN: 240 mL / OUT: 250 mL / NET: -10 mL      PHYSICAL EXAM:  General:A&Ox3, resting comfortably  HEENT: Right periauricular bruising and tenderness, EOMI, PERRL.   Neck: Soft, midline trachea, nontender to palpation  throughout spine. ROM intact  Chest: R posterior chest wall tenderness  Abdomen: Soft, non-distended, non-tender; no rebound or guarding  Ext: nontender to palpation throughout LUE, bilateral LE, strength 5/5 bilaterally in the upper/lower extremities. No abrasions.   Back: nontender in midline, no palpable runoff/stepoff/deformity  Vascular: Pulses palpable left radial, bilateral dp, right arm casted but with good cap refill                CRANIAL NERVES: II - normal visual acuity testing with Snellen. III/IV/VI - EOM's intact, painless. V - Normal sensation throughout 3 branches. VII - Normal and symmetric eyebrow raise; cheek puff symmetric; normal and symmetric smile; Normal strength with eye closing b/l. XI - normal shoulder shrug, neck flexion & lateral rotation. XII - Normal and symmetric tongue protrusion.      LABS:      MICROBIOLOGY:      ------------------------------------------------------------------------------------------  RADIOLOGICAL FINDINGS REVIEW:    CT Head 18:49  IMPRESSION:  No intracranial hemorrhage is identified.    CT Head 11:43  IMPRESSION: No acute intracranial injury is appreciated on the   current examination.   Small focus of extra-axial hemorrhage right middle   cranial fossa seen on prior CT earlier same date is not convincingly   demonstrated on the current examination.    CT Head 05:36  IMPRESSION: Calvarial fractures involving the right sphenoid bone with probable   diastases of the right zygomaticotemporal suture.    Tiny focus of extra-axial blood products subjacent to the fracture sites   measuring 2 to 3 mmin axial thickness. Short interval follow-up CT   recommended to assess stability.    CT Facial:  IMPRESSION: Minimally displaced fracture right calvarium near the   sphenoid squamous suture. Nondisplaced zygomatic arch fracture. These   findings are unchanged from earlier same day    XR R Forearm:  IMPRESSION: Again noted is a comminuted, impacted, intra-articular   fracture of the distal right radius with minimal dorsal angulation,   unchanged in appearance. An overlying cast is in place.    CXR R Shoulder:  IMPRESSION:  No fractures or dislocations.  Preserved joint spaces. Maintained subacromial and coracoclavicular   spaces.  No discrete lytic or blastic lesions.  No periarticular soft tissue calcifications.  Unremarkable imaged peripheral right hemithorax.    CT Abd/Pelvis w/IV Cont  IMPRESSION:  Acute minimally displaced right lateral fourth through sixth rib   fractures.  Right gluteal subcutaneous hematoma.    CT Chest w/ IV Contrast:  IMPRESSION:  Acute minimally displaced right lateral fourth through sixth rib   fractures.  Right gluteal subcutaneous hematoma.    CT Cervical Spine:  IMPRESSION:   No cervical spine fracture or traumatic malalignment.    XR R Wrist:  IMPRESSION:  Distal radial intra-articular fracture    CXR:  IMPRESSION:  Clear lungs.    List Injuries Identified to Date:    - Comminuted, impacted, intra-articular   fracture of the distal right radius with minimal dorsal angulation  - Minimally displaced fracture right calvarium near the   sphenoid squamous suture.   - Nondisplaced zygomatic arch fracture.   -Acute minimally displaced right lateral fourth through sixth rib   fractures.  - Right gluteal subcutaneous hematoma.  - Distal radial intra-articular fracture    Consults (Date):  [x] OMFS   [x] Orthopedic Surgery  [x] Neurosurgery    INTERPRETATION/ASSESSMENT:   COLTON COHEN is a 57y Female who required a tertiary survey due to fall.    PLAN:   - Pain medications PRN  - OOB as tolerated  - Incentive spirometry  - Ortho recs appreciated; NWB RUE with splint and sling  - Neurosx recs appreciated; no acute neurosx int; 5 hr repeat CTH stable  - OMFS recs appreciated; no acute OMFS int   - Start VTE ppx 24 hours after 12 hr stability scan as per neurosx  - PT recs in progress  - Discharge planning pending PT recs

## 2023-03-13 NOTE — DISCHARGE NOTE PROVIDER - NSDCFUADDAPPT_GEN_ALL_CORE_FT
[facility]   Address: [address] [place] [address]  Phone: [phone]

## 2023-03-13 NOTE — DISCHARGE NOTE NURSING/CASE MANAGEMENT/SOCIAL WORK - NSDCFUADDAPPT_GEN_ALL_CORE_FT
[facility]   Address: [address] [place] [address]  Phone: [phone]

## 2023-03-13 NOTE — DISCHARGE NOTE PROVIDER - CARE PROVIDER_API CALL
Gallito Stone)  Orthopedics  [address] [place] [id number]  Phone: [phone]  Fax: [phone]  Follow Up Time:     Matt Cleary)  Neurosurgery  [address] [facility], [facility] [address]  [place] [address]  Phone: [phone]  Fax: [phone]  Follow Up Time:

## 2023-03-13 NOTE — DISCHARGE NOTE NURSING/CASE MANAGEMENT/SOCIAL WORK - PATIENT PORTAL LINK FT
You can access the FollowMyHealth Patient Portal offered by [facility] by registering at the following website: [email or web address]. By joining [employer]’s FollowMyHealth portal, you will also be able to view your health information using other applications (apps) compatible with our system.

## 2023-03-20 ENCOUNTER — NON-APPOINTMENT (OUTPATIENT)
Age: 58
End: 2023-03-20

## 2023-03-21 ENCOUNTER — TRANSCRIPTION ENCOUNTER (OUTPATIENT)
Age: 58
End: 2023-03-21

## 2023-03-21 ENCOUNTER — NON-APPOINTMENT (OUTPATIENT)
Age: 58
End: 2023-03-21

## 2023-03-21 ENCOUNTER — APPOINTMENT (OUTPATIENT)
Dept: ORTHOPEDIC SURGERY | Facility: CLINIC | Age: 58
End: 2023-03-21
Payer: COMMERCIAL

## 2023-03-21 VITALS — HEIGHT: 65 IN | BODY MASS INDEX: 24.99 KG/M2 | WEIGHT: 150 LBS

## 2023-03-21 PROCEDURE — 73100 X-RAY EXAM OF WRIST: CPT | Mod: RT

## 2023-03-21 PROCEDURE — 99204 OFFICE O/P NEW MOD 45 MIN: CPT

## 2023-03-24 ENCOUNTER — APPOINTMENT (OUTPATIENT)
Dept: ORTHOPEDIC SURGERY | Facility: CLINIC | Age: 58
End: 2023-03-24
Payer: COMMERCIAL

## 2023-03-24 PROCEDURE — 99214 OFFICE O/P EST MOD 30 MIN: CPT | Mod: 25

## 2023-03-24 PROCEDURE — 29125 APPL SHORT ARM SPLINT STATIC: CPT | Mod: RT

## 2023-03-24 PROCEDURE — 73100 X-RAY EXAM OF WRIST: CPT | Mod: RT

## 2023-03-28 ENCOUNTER — OUTPATIENT (OUTPATIENT)
Dept: OUTPATIENT SERVICES | Facility: HOSPITAL | Age: 58
LOS: 1 days | End: 2023-03-28

## 2023-03-28 VITALS
SYSTOLIC BLOOD PRESSURE: 101 MMHG | HEIGHT: 65 IN | RESPIRATION RATE: 16 BRPM | DIASTOLIC BLOOD PRESSURE: 68 MMHG | OXYGEN SATURATION: 98 % | WEIGHT: 165.35 LBS | HEART RATE: 72 BPM | TEMPERATURE: 98 F

## 2023-03-28 DIAGNOSIS — Z87.59 PERSONAL HISTORY OF OTHER COMPLICATIONS OF PREGNANCY, CHILDBIRTH AND THE PUERPERIUM: Chronic | ICD-10-CM

## 2023-03-28 DIAGNOSIS — S52.501A UNSPECIFIED FRACTURE OF THE LOWER END OF RIGHT RADIUS, INITIAL ENCOUNTER FOR CLOSED FRACTURE: ICD-10-CM

## 2023-03-28 DIAGNOSIS — Z87.2 PERSONAL HISTORY OF DISEASES OF THE SKIN AND SUBCUTANEOUS TISSUE: Chronic | ICD-10-CM

## 2023-03-28 LAB
ANION GAP SERPL CALC-SCNC: 12 MMOL/L — SIGNIFICANT CHANGE UP (ref 7–14)
BUN SERPL-MCNC: 10 MG/DL — SIGNIFICANT CHANGE UP (ref 7–23)
CALCIUM SERPL-MCNC: 9.2 MG/DL — SIGNIFICANT CHANGE UP (ref 8.4–10.5)
CHLORIDE SERPL-SCNC: 105 MMOL/L — SIGNIFICANT CHANGE UP (ref 98–107)
CO2 SERPL-SCNC: 25 MMOL/L — SIGNIFICANT CHANGE UP (ref 22–31)
CREAT SERPL-MCNC: 0.62 MG/DL — SIGNIFICANT CHANGE UP (ref 0.5–1.3)
EGFR: 104 ML/MIN/1.73M2 — SIGNIFICANT CHANGE UP
GLUCOSE SERPL-MCNC: 102 MG/DL — HIGH (ref 70–99)
HCT VFR BLD CALC: 35.4 % — SIGNIFICANT CHANGE UP (ref 34.5–45)
HGB BLD-MCNC: 11 G/DL — LOW (ref 11.5–15.5)
MCHC RBC-ENTMCNC: 25.1 PG — LOW (ref 27–34)
MCHC RBC-ENTMCNC: 31.1 GM/DL — LOW (ref 32–36)
MCV RBC AUTO: 80.6 FL — SIGNIFICANT CHANGE UP (ref 80–100)
NRBC # BLD: 0 /100 WBCS — SIGNIFICANT CHANGE UP (ref 0–0)
NRBC # FLD: 0 K/UL — SIGNIFICANT CHANGE UP (ref 0–0)
PLATELET # BLD AUTO: 511 K/UL — HIGH (ref 150–400)
POTASSIUM SERPL-MCNC: 4 MMOL/L — SIGNIFICANT CHANGE UP (ref 3.5–5.3)
POTASSIUM SERPL-SCNC: 4 MMOL/L — SIGNIFICANT CHANGE UP (ref 3.5–5.3)
RBC # BLD: 4.39 M/UL — SIGNIFICANT CHANGE UP (ref 3.8–5.2)
RBC # FLD: 12.8 % — SIGNIFICANT CHANGE UP (ref 10.3–14.5)
SODIUM SERPL-SCNC: 142 MMOL/L — SIGNIFICANT CHANGE UP (ref 135–145)
WBC # BLD: 7.33 K/UL — SIGNIFICANT CHANGE UP (ref 3.8–10.5)
WBC # FLD AUTO: 7.33 K/UL — SIGNIFICANT CHANGE UP (ref 3.8–10.5)

## 2023-03-28 NOTE — H&P PST ADULT - SPO2 (%)
Surgery Post-Op Note    Procedure: Fistulogram    SUBJECTIVE:  Pt seen and examined at the bedside. Pt w/ no complaints. Denies F/C/N/V. Pain controlled with medication. No chest pain or shortness of breath. No weakness or loss of sensation.     OBJECTIVE:  Vital Signs Last 24 Hrs  T(C): 36.5 (11 Jan 2022 14:10), Max: 36.5 (11 Jan 2022 05:17)  T(F): 97.7 (11 Jan 2022 14:10), Max: 97.7 (11 Jan 2022 05:17)  HR: 60 (11 Jan 2022 15:10) (60 - 73)  BP: 126/64 (11 Jan 2022 15:10) (124/70 - 149/83)  BP(mean): --  RR: 18 (11 Jan 2022 15:10) (18 - 18)  SpO2: 100% (11 Jan 2022 15:10) (99% - 100%)    Physical Exam:  General: NAD, resting comfortably in bed  Neuro: A/O x 3, no focal deficits, RAMIREZ, SILT  Pulmonary: Nonlabored breathing, no respiratory distress  Abdominal: soft, NTTP, ND  Incision: C/D/I   Extremities: WWP, palpable thrill in RUE, Motor and sensation intact, non-tender    LABS:                        12.9   6.36  )-----------( 107      ( 11 Jan 2022 07:24 )             40.7     01-11    137  |  94<L>  |  68<H>  ----------------------------<  89  4.6   |  20<L>  |  11.85<H>    Ca    5.4<LL>      11 Jan 2022 07:24  Phos  6.9     01-11  Mg     2.00     01-11    TPro  7.8  /  Alb  4.4  /  TBili  0.3  /  DBili  x   /  AST  16  /  ALT  20  /  AlkPhos  106  01-10    PT/INR - ( 10 Jorje 2022 13:12 )   PT: 12.9 sec;   INR: 1.14 ratio         PTT - ( 10 Jorje 2022 13:12 )  PTT:30.4 sec  CAPILLARY BLOOD GLUCOSE          LIVER FUNCTIONS - ( 10 Jorje 2022 15:45 )  Alb: 4.4 g/dL / Pro: 7.8 g/dL / ALK PHOS: 106 U/L / ALT: 20 U/L / AST: 16 U/L / GGT: x               IMAGING:    ASSESSMENT:67y Male now 4hours s/p RUE arteriovenous fistulogram. Patient recovering without complaints or deficits.     PLAN:  - continue using fistula for HD  - Pain control  - Monitor vitals  - Diet: continue regular (renal restricted)  - OOB/ Ambulate  - DVT ppx: heparin subq    Team Vascular Surgery  [id number] Surgery Post-Op Note    Procedure: Fistulogram    SUBJECTIVE:  Pt seen and examined at the bedside. Pt w/ no complaints. Denies F/C/N/V. Pain controlled with medication. No chest pain or shortness of breath. No weakness or loss of sensation.     OBJECTIVE:  Vital Signs Last 24 Hrs  T(C): 36.5 (11 Jan 2022 14:10), Max: 36.5 (11 Jan 2022 05:17)  T(F): 97.7 (11 Jan 2022 14:10), Max: 97.7 (11 Jan 2022 05:17)  HR: 60 (11 Jan 2022 15:10) (60 - 73)  BP: 126/64 (11 Jan 2022 15:10) (124/70 - 149/83)  BP(mean): --  RR: 18 (11 Jan 2022 15:10) (18 - 18)  SpO2: 100% (11 Jan 2022 15:10) (99% - 100%)    Physical Exam:  General: NAD, resting comfortably in bed  Neuro: A/O x 3, no focal deficits, RAMIREZ, SILT  Pulmonary: Nonlabored breathing, no respiratory distress  Abdominal: soft, NTTP, ND  Incision: C/D/I , no hematoma  Extremities: WWP, palpable thrill in RUE, Motor and sensation intact, non-tender    LABS:                        12.9   6.36  )-----------( 107      ( 11 Jan 2022 07:24 )             40.7     01-11    137  |  94<L>  |  68<H>  ----------------------------<  89  4.6   |  20<L>  |  11.85<H>    Ca    5.4<LL>      11 Jan 2022 07:24  Phos  6.9     01-11  Mg     2.00     01-11    TPro  7.8  /  Alb  4.4  /  TBili  0.3  /  DBili  x   /  AST  16  /  ALT  20  /  AlkPhos  106  01-10    PT/INR - ( 10 Jorje 2022 13:12 )   PT: 12.9 sec;   INR: 1.14 ratio         PTT - ( 10 Jorje 2022 13:12 )  PTT:30.4 sec  CAPILLARY BLOOD GLUCOSE          LIVER FUNCTIONS - ( 10 Jorje 2022 15:45 )  Alb: 4.4 g/dL / Pro: 7.8 g/dL / ALK PHOS: 106 U/L / ALT: 20 U/L / AST: 16 U/L / GGT: x               IMAGING:    ASSESSMENT:67y Male now 4hours s/p RUE arteriovenous fistulogram. Patient recovering without complaints or deficits.     PLAN:  - continue using fistula for HD  - Pain control  - Monitor vitals  - Diet: continue regular (renal restricted)  - OOB/ Ambulate  - DVT ppx: heparin subq    Team Vascular Surgery  [phone]

## 2023-03-28 NOTE — H&P PST ADULT - NSICDXPASTMEDICALHX_GEN_ALL_CORE_FT
PAST MEDICAL HISTORY:  Unspecified fracture of the lower end of right radius, initial encounter for closed fracture

## 2023-03-28 NOTE — H&P PST ADULT - MUSCULOSKELETAL COMMENTS
Pt says " I fell from the stairs and fractured my right lower arm few days ago" Pre op dx- Unspecified fracture of the lower end of right radius, initial encounter for closed fracture

## 2023-03-28 NOTE — H&P PST ADULT - PROBLEM SELECTOR PLAN 1
Patient tentatively scheduled for open fracture of intraarticular radial fracture 3 or more fragments on 03/30/2023.    Pre-op instructions provided. Pt given verbal and written instructions with teach back on pepcid. Pt verbalized understanding with return demonstration.    Labs done.

## 2023-03-28 NOTE — H&P PST ADULT - HISTORY OF PRESENT ILLNESS
57 year old female with pre op dx of unspecified fracture of the lower end of right radius, initial encounter for closed fracture is scheduled for open fracture of intraarticular radial fracture 3 or more fragments.

## 2023-03-29 ENCOUNTER — TRANSCRIPTION ENCOUNTER (OUTPATIENT)
Age: 58
End: 2023-03-29

## 2023-03-29 VITALS
OXYGEN SATURATION: 100 % | HEIGHT: 65 IN | HEART RATE: 62 BPM | TEMPERATURE: 98 F | WEIGHT: 165.35 LBS | DIASTOLIC BLOOD PRESSURE: 65 MMHG | RESPIRATION RATE: 18 BRPM | SYSTOLIC BLOOD PRESSURE: 111 MMHG

## 2023-03-30 ENCOUNTER — OUTPATIENT (OUTPATIENT)
Dept: OUTPATIENT SERVICES | Facility: HOSPITAL | Age: 58
LOS: 1 days | Discharge: HOME | End: 2023-03-30
Payer: COMMERCIAL

## 2023-03-30 ENCOUNTER — TRANSCRIPTION ENCOUNTER (OUTPATIENT)
Age: 58
End: 2023-03-30

## 2023-03-30 ENCOUNTER — APPOINTMENT (OUTPATIENT)
Dept: ORTHOPEDIC SURGERY | Facility: AMBULATORY SURGERY CENTER | Age: 58
End: 2023-03-30

## 2023-03-30 VITALS
DIASTOLIC BLOOD PRESSURE: 61 MMHG | SYSTOLIC BLOOD PRESSURE: 121 MMHG | HEART RATE: 87 BPM | OXYGEN SATURATION: 99 % | RESPIRATION RATE: 16 BRPM | TEMPERATURE: 97 F

## 2023-03-30 DIAGNOSIS — S52.501A UNSPECIFIED FRACTURE OF THE LOWER END OF RIGHT RADIUS, INITIAL ENCOUNTER FOR CLOSED FRACTURE: ICD-10-CM

## 2023-03-30 DIAGNOSIS — Z87.2 PERSONAL HISTORY OF DISEASES OF THE SKIN AND SUBCUTANEOUS TISSUE: Chronic | ICD-10-CM

## 2023-03-30 DIAGNOSIS — Z87.59 PERSONAL HISTORY OF OTHER COMPLICATIONS OF PREGNANCY, CHILDBIRTH AND THE PUERPERIUM: Chronic | ICD-10-CM

## 2023-03-30 PROCEDURE — 25609 OPTX DST RD XART FX/EP SEP3+: CPT | Mod: RT

## 2023-03-30 DEVICE — SCREW CORT 2.5X12MM: Type: IMPLANTABLE DEVICE | Status: FUNCTIONAL

## 2023-03-30 DEVICE — PLATE TRILOCK DIST RAD VOLAR RT: Type: IMPLANTABLE DEVICE | Status: FUNCTIONAL

## 2023-03-30 DEVICE — SCREW TRILOCK 2.5X16MM: Type: IMPLANTABLE DEVICE | Status: FUNCTIONAL

## 2023-03-30 DEVICE — SCREW TRILOCK 2.5X18MM: Type: IMPLANTABLE DEVICE | Status: FUNCTIONAL

## 2023-03-30 DEVICE — K-WIRE MEDARTIS (SMOOTH) SINGLE TROCAR 1.6MM X 150MM: Type: IMPLANTABLE DEVICE | Status: FUNCTIONAL

## 2023-03-30 DEVICE — SCREW CORT 2.5X24MM: Type: IMPLANTABLE DEVICE | Status: FUNCTIONAL

## 2023-03-30 DEVICE — SCREW CORT 2.5X13MM: Type: IMPLANTABLE DEVICE | Status: FUNCTIONAL

## 2023-03-30 RX ORDER — OXYCODONE 5 MG/1
5 TABLET ORAL
Qty: 10 | Refills: 0 | Status: ACTIVE | COMMUNITY
Start: 2023-03-30 | End: 1900-01-01

## 2023-03-30 NOTE — ASU DISCHARGE PLAN (ADULT/PEDIATRIC) - CARE PROVIDER_API CALL
Gallito Stone)  Orthopedics  [address] [place] [address]  Phone: [phone]  Fax: [phone]  Follow Up Time: 2 weeks

## 2023-03-30 NOTE — ASU DISCHARGE PLAN (ADULT/PEDIATRIC) - NS MD DC FALL RISK RISK
For information on Fall & Injury Prevention, visit: [email or web address] OR  [email or web address] OR  https://www.cdc.gov/steadi/patient.html

## 2023-04-03 NOTE — ASSESSMENT
[FreeTextEntry1] : 57 year-old F with R distal radius fracture with increased interval displacement.  Counseled that we could continue to watch and treat nonoperatively but if further displaced would recommend surgery.  Ms. Goodrich would like to proceed with surgery as she is not willing to continue nonoperatively and risk needing surgery later.  Apprised to risks and benefits and has elected for operative intervention.\par \par Plan:\par NWB RUE\par Maintain splint\par Medical clearance and PSTs

## 2023-04-03 NOTE — HISTORY OF PRESENT ILLNESS
[FreeTextEntry1] : 57 year-old F presents for follow-up of her right distal radius fracture.  Continues with wrist pain.  Denies numbness.  Here today for evaluation to determine operative vs nonoperative intervention

## 2023-04-03 NOTE — ASSESSMENT
[FreeTextEntry1] : 57 year-old F with R distal radius fracture attemtping nonop tx\par \par Plan:\par Return in 1 week for XR out of splint\par Final decision for tx based on next XR\par NWB RUE\par

## 2023-04-03 NOTE — PHYSICAL EXAM
[de-identified] : Constitutional: Alert and in no acute distress. Psychiatric: Oriented to person, place, and time. Insight and judgement were intact and the affect was normal. Cardiovascular: Regular rate assessed through peripheral pulses. Pulmonary: Nonlabored breathing on room air. Lymphatics: No peripheral lymphadenopathy appreciated.  Musculoskeletal:  Skin is intact. Splint removed.  Moderate swelling and ecchymosis. No skin breaks.  Mobility is full about the shoulders.Digital flexion and extension is limited. Thumb opposition is limited.    Sensation is intact to light touch in the ulnar, median, and radial nerve distributions. Motor is intact in the ulnar, median, and radial nerve distributions. Fingers are pink and well perfused. Capillary refill is brisk.\par  [de-identified] : XR R Wrist: 3 views demonstrate increased interval displacement from prior.  Loss of radial height, intra-articular comminution and impaction.

## 2023-04-03 NOTE — PHYSICAL EXAM
[de-identified] : Constitutional: Alert and in no acute distress. Psychiatric: Oriented to person, place, and time. Insight and judgement were intact and the affect was normal. Cardiovascular: Regular rate assessed through peripheral pulses. Pulmonary: Nonlabored breathing on room air. Lymphatics: No peripheral lymphadenopathy appreciated.  Musculoskeletal:  Skin is intact. Sugartong splint in place.  Mobility is full about the shoulders.Digital flexion and extension is limited. Thumb opposition is limited.    Sensation is intact to light touch in the ulnar, median, and radial nerve distributions. Motor is intact in the ulnar, median, and radial nerve distributions. Fingers are pink and well perfused. Capillary refill is brisk.\par  [de-identified] : XR R Wrist: 3 views demonstrate slight loss of radial height in splint compared with prior.  Comminuted, intra-articular and impacted fracture

## 2023-04-03 NOTE — HISTORY OF PRESENT ILLNESS
[FreeTextEntry1] : She presents today for evaluation of her right distal radius fracture  Date of injury: 03/11/23  Method of injury: fall\par \par 57 year-old F presents s/p fall onto right outstretched hand.  Immediate pain and deformity.  Went to ER where closed reduction and splinting performed.  Denies open injury.  Denies numbness and tingling.\par

## 2023-04-10 ENCOUNTER — APPOINTMENT (OUTPATIENT)
Dept: ORTHOPEDIC SURGERY | Facility: CLINIC | Age: 58
End: 2023-04-10
Payer: COMMERCIAL

## 2023-04-10 VITALS
TEMPERATURE: 97.4 F | HEIGHT: 65 IN | OXYGEN SATURATION: 98 % | BODY MASS INDEX: 24.99 KG/M2 | WEIGHT: 150 LBS | HEART RATE: 67 BPM

## 2023-04-10 PROBLEM — S52.501A UNSPECIFIED FRACTURE OF THE LOWER END OF RIGHT RADIUS, INITIAL ENCOUNTER FOR CLOSED FRACTURE: Chronic | Status: ACTIVE | Noted: 2023-03-28

## 2023-04-10 PROCEDURE — 99024 POSTOP FOLLOW-UP VISIT: CPT

## 2023-04-10 PROCEDURE — 73100 X-RAY EXAM OF WRIST: CPT | Mod: RT

## 2023-04-10 NOTE — HISTORY OF PRESENT ILLNESS
[de-identified] : ORIF R distal radius fracture [de-identified] : Patient has not been doing hand exercises and has been using sling\par reports pain and stiffness at hand [de-identified] : 2cm tip to palm\par 0 pronation, 30 supination\par Wrist flex-ex 10-10\par SILT throughout\par 2+ radial pulse\par Hand is not cool nor swollen [de-identified] : XR R Wrist: 2 views demonstrate reduced right distal radius fracture with stable hardware placement [de-identified] : S/p ORIF R distal radius fracture [de-identified] : OT for ROM\par NSAIDs\par F/u in 2 weeks to assess for change in symptoms

## 2023-04-20 DIAGNOSIS — M25.531 PAIN IN RIGHT WRIST: ICD-10-CM

## 2023-04-24 ENCOUNTER — APPOINTMENT (OUTPATIENT)
Dept: ORTHOPEDIC SURGERY | Facility: CLINIC | Age: 58
End: 2023-04-24
Payer: COMMERCIAL

## 2023-04-24 VITALS
BODY MASS INDEX: 24.99 KG/M2 | HEIGHT: 65 IN | TEMPERATURE: 97.6 F | HEART RATE: 76 BPM | WEIGHT: 150 LBS | SYSTOLIC BLOOD PRESSURE: 115 MMHG | DIASTOLIC BLOOD PRESSURE: 77 MMHG | OXYGEN SATURATION: 99 %

## 2023-04-24 PROCEDURE — 73100 X-RAY EXAM OF WRIST: CPT | Mod: RT

## 2023-04-24 PROCEDURE — 99024 POSTOP FOLLOW-UP VISIT: CPT

## 2023-04-24 RX ORDER — METHYLPREDNISOLONE 4 MG/1
4 TABLET ORAL
Qty: 1 | Refills: 0 | Status: ACTIVE | COMMUNITY
Start: 2023-04-24 | End: 1900-01-01

## 2023-05-08 ENCOUNTER — APPOINTMENT (OUTPATIENT)
Dept: ORTHOPEDIC SURGERY | Facility: CLINIC | Age: 58
End: 2023-05-08
Payer: COMMERCIAL

## 2023-05-08 VITALS
OXYGEN SATURATION: 99 % | SYSTOLIC BLOOD PRESSURE: 121 MMHG | TEMPERATURE: 97.7 F | DIASTOLIC BLOOD PRESSURE: 67 MMHG | HEIGHT: 65 IN | BODY MASS INDEX: 24.99 KG/M2 | HEART RATE: 75 BPM | WEIGHT: 150 LBS

## 2023-05-08 PROCEDURE — 99024 POSTOP FOLLOW-UP VISIT: CPT

## 2023-05-26 ENCOUNTER — OUTPATIENT (OUTPATIENT)
Dept: OUTPATIENT SERVICES | Facility: HOSPITAL | Age: 58
LOS: 1 days | End: 2023-05-26
Payer: COMMERCIAL

## 2023-05-26 VITALS
HEIGHT: 64 IN | HEART RATE: 70 BPM | RESPIRATION RATE: 18 BRPM | WEIGHT: 154.98 LBS | SYSTOLIC BLOOD PRESSURE: 122 MMHG | DIASTOLIC BLOOD PRESSURE: 78 MMHG | OXYGEN SATURATION: 99 % | TEMPERATURE: 98 F

## 2023-05-26 DIAGNOSIS — S52.90XA UNSPECIFIED FRACTURE OF UNSPECIFIED FOREARM, INITIAL ENCOUNTER FOR CLOSED FRACTURE: Chronic | ICD-10-CM

## 2023-05-26 DIAGNOSIS — Z87.2 PERSONAL HISTORY OF DISEASES OF THE SKIN AND SUBCUTANEOUS TISSUE: Chronic | ICD-10-CM

## 2023-05-26 DIAGNOSIS — N95.0 POSTMENOPAUSAL BLEEDING: ICD-10-CM

## 2023-05-26 DIAGNOSIS — Z01.818 ENCOUNTER FOR OTHER PREPROCEDURAL EXAMINATION: ICD-10-CM

## 2023-05-26 DIAGNOSIS — Z87.59 PERSONAL HISTORY OF OTHER COMPLICATIONS OF PREGNANCY, CHILDBIRTH AND THE PUERPERIUM: Chronic | ICD-10-CM

## 2023-05-26 PROCEDURE — 85027 COMPLETE CBC AUTOMATED: CPT

## 2023-05-26 PROCEDURE — 86901 BLOOD TYPING SEROLOGIC RH(D): CPT

## 2023-05-26 PROCEDURE — G0463: CPT

## 2023-05-26 PROCEDURE — 86850 RBC ANTIBODY SCREEN: CPT

## 2023-05-26 PROCEDURE — 86900 BLOOD TYPING SEROLOGIC ABO: CPT

## 2023-05-26 RX ORDER — IBUPROFEN 200 MG
1 TABLET ORAL
Refills: 0 | DISCHARGE

## 2023-05-26 RX ORDER — ACETAMINOPHEN 500 MG
2 TABLET ORAL
Refills: 0 | DISCHARGE

## 2023-05-26 RX ORDER — SODIUM CHLORIDE 9 MG/ML
1000 INJECTION, SOLUTION INTRAVENOUS
Refills: 0 | Status: DISCONTINUED | OUTPATIENT
Start: 2023-06-01 | End: 2023-06-15

## 2023-05-26 RX ORDER — LIDOCAINE HCL 20 MG/ML
0.2 VIAL (ML) INJECTION ONCE
Refills: 0 | Status: DISCONTINUED | OUTPATIENT
Start: 2023-06-01 | End: 2023-06-15

## 2023-05-26 NOTE — H&P PST ADULT - HISTORY OF PRESENT ILLNESS
57 year old female who had PMB. Work-up was done and polyp was found. Now coming in for D&C operative hysteroscopy. Pt was hosp x4 days in 4/2022 for accidental fall on home stairs. Had ORIF right radius. Fx 3 ribs. Will get medical eval from Dr. Valenzuela.  No sig med hx. Denies covid hx.

## 2023-05-26 NOTE — H&P PST ADULT - NSICDXPASTSURGICALHX_GEN_ALL_CORE_FT
PAST SURGICAL HISTORY:  H/O sebaceous cyst     History of ectopic pregnancy     Nondisplaced fracture of radius

## 2023-05-26 NOTE — H&P PST ADULT - ASSESSMENT
DASI score: 7.5  DASI activity: elliptical 30min walking 30min 5xwk  Loose teeth or denture: Denies

## 2023-05-26 NOTE — H&P PST ADULT - NS ATTEND AMEND GEN_ALL_CORE FT
patient presents for diagnostic hysteroscopy, D&C, polypectomy for PMB and endometrial polyp  discussed with patient r/b/a of procedure and agrees to proceed    KENNETH Miranda MD

## 2023-05-26 NOTE — H&P PST ADULT - NSICDXPASTMEDICALHX_GEN_ALL_CORE_FT
PAST MEDICAL HISTORY:  Fracture in accidental fall     History of insomnia     Unspecified fracture of the lower end of right radius, initial encounter for closed fracture

## 2023-05-31 ENCOUNTER — TRANSCRIPTION ENCOUNTER (OUTPATIENT)
Age: 58
End: 2023-05-31

## 2023-06-01 ENCOUNTER — TRANSCRIPTION ENCOUNTER (OUTPATIENT)
Age: 58
End: 2023-06-01

## 2023-06-01 ENCOUNTER — OUTPATIENT (OUTPATIENT)
Dept: OUTPATIENT SERVICES | Facility: HOSPITAL | Age: 58
LOS: 1 days | End: 2023-06-01
Payer: COMMERCIAL

## 2023-06-01 ENCOUNTER — RESULT REVIEW (OUTPATIENT)
Age: 58
End: 2023-06-01

## 2023-06-01 VITALS
HEIGHT: 64 IN | HEART RATE: 71 BPM | TEMPERATURE: 97 F | RESPIRATION RATE: 16 BRPM | SYSTOLIC BLOOD PRESSURE: 107 MMHG | OXYGEN SATURATION: 98 % | WEIGHT: 154.98 LBS | DIASTOLIC BLOOD PRESSURE: 71 MMHG

## 2023-06-01 VITALS
TEMPERATURE: 98 F | SYSTOLIC BLOOD PRESSURE: 133 MMHG | DIASTOLIC BLOOD PRESSURE: 71 MMHG | HEART RATE: 80 BPM | OXYGEN SATURATION: 98 % | RESPIRATION RATE: 16 BRPM

## 2023-06-01 DIAGNOSIS — Z87.2 PERSONAL HISTORY OF DISEASES OF THE SKIN AND SUBCUTANEOUS TISSUE: Chronic | ICD-10-CM

## 2023-06-01 DIAGNOSIS — N95.0 POSTMENOPAUSAL BLEEDING: ICD-10-CM

## 2023-06-01 DIAGNOSIS — S52.90XA UNSPECIFIED FRACTURE OF UNSPECIFIED FOREARM, INITIAL ENCOUNTER FOR CLOSED FRACTURE: Chronic | ICD-10-CM

## 2023-06-01 DIAGNOSIS — Z87.59 PERSONAL HISTORY OF OTHER COMPLICATIONS OF PREGNANCY, CHILDBIRTH AND THE PUERPERIUM: Chronic | ICD-10-CM

## 2023-06-01 PROCEDURE — 88305 TISSUE EXAM BY PATHOLOGIST: CPT | Mod: 26

## 2023-06-01 PROCEDURE — 88305 TISSUE EXAM BY PATHOLOGIST: CPT

## 2023-06-01 PROCEDURE — 58558 HYSTEROSCOPY BIOPSY: CPT

## 2023-06-01 RX ORDER — ACETAMINOPHEN 500 MG
3 TABLET ORAL
Qty: 0 | Refills: 0 | DISCHARGE

## 2023-06-01 RX ORDER — IBUPROFEN 200 MG
1 TABLET ORAL
Qty: 0 | Refills: 0 | DISCHARGE

## 2023-06-01 RX ORDER — FENTANYL CITRATE 50 UG/ML
50 INJECTION INTRAVENOUS
Refills: 0 | Status: DISCONTINUED | OUTPATIENT
Start: 2023-06-01 | End: 2023-06-01

## 2023-06-01 RX ORDER — ONDANSETRON 8 MG/1
4 TABLET, FILM COATED ORAL ONCE
Refills: 0 | Status: DISCONTINUED | OUTPATIENT
Start: 2023-06-01 | End: 2023-06-15

## 2023-06-01 RX ORDER — TRAZODONE HCL 50 MG
1 TABLET ORAL
Qty: 0 | Refills: 0 | DISCHARGE

## 2023-06-01 NOTE — BRIEF OPERATIVE NOTE - OPERATION/FINDINGS
EUA: approximately 6 week sized uterus, bilateral adnexa nonpalpable. Grossly normal appearing external genitalia, cervix, vagina  Hysteroscopy: small cervical polyp on L wall of cervix, 1x1cm sessile endometrial mass (likely fibroid) on anterior wall of uterus, bilateral ostia visualized and wnl, otherwise normal appearing endometrium and cavity.

## 2023-06-01 NOTE — BRIEF OPERATIVE NOTE - NSICDXBRIEFPROCEDURE_GEN_ALL_CORE_FT
PROCEDURES:  Hysteroscopy with dilation and curettage of uterus and surgical removal of polyp or leiomyoma 01-Jun-2023 12:15:54  Onelia Quinn

## 2023-06-01 NOTE — BRIEF OPERATIVE NOTE - NSICDXBRIEFPREOP_GEN_ALL_CORE_FT
PRE-OP DIAGNOSIS:  Abnormal uterine bleeding due to endometrial polyp 01-Jun-2023 12:16:12  Onelia Quinn

## 2023-06-01 NOTE — CHART NOTE - NSCHARTNOTEFT_GEN_A_CORE
Patient seen and examined at bedside, recently post-op. No acute complaints at this time. Denies CP, SOB, N/V, fevers, and chills. Pt ambulating and voiding spontaneously.    Vital Signs Last 24 Hours  T(C): 36.4 (06-01-23 @ 13:33), Max: 36.4 (06-01-23 @ 12:03)  HR: 80 (06-01-23 @ 13:33) (60 - 80)  BP: 133/71 (06-01-23 @ 13:33) (104/63 - 134/80)  RR: 16 (06-01-23 @ 13:33) (11 - 16)  SpO2: 98% (06-01-23 @ 13:33) (95% - 100%)    I&O's Summary    01 Jun 2023 07:01  -  01 Jun 2023 14:05  --------------------------------------------------------  IN: 300 mL / OUT: 20 mL / NET: 280 mL        Physical Exam:  General: NAD  CV: well perfused  Lungs: breathing comfortably on RA  Abdomen: Soft, appropriately tender, non-distended  : pad 20% saturated  Ext: No pain or swelling    Labs:      MEDICATIONS  (STANDING):  lactated ringers. 1000 milliLiter(s) (100 mL/Hr) IV Continuous <Continuous>  lidocaine 1% Injectable 0.2 milliLiter(s) Local Injection once    MEDICATIONS  (PRN):  fentaNYL    Injectable 50 MICROGram(s) IV Push every 15 minutes PRN Severe Pain (7 - 10)  ondansetron Injectable 4 milliGRAM(s) IV Push once PRN Nausea and/or Vomiting      58yo s/p operative hysteroscopy, D&C recovering well in acute post-operative state.    Neuro: PO pain meds  CV: Hemodynamically stable  Pulm: Ecourage ambulation  GI: Regular Diet  : voiding spontaneously   Heme: SCDs  ID: afebrile  Endo: no active issues  Dispo: ASU discharge    Onelia Quinn, PGY1

## 2023-06-01 NOTE — ASU DISCHARGE PLAN (ADULT/PEDIATRIC) - ASU DC SPECIAL INSTRUCTIONSFT
Nothing per vagina for 2 weeks- no douching, tampons, sitz baths, sexual intercourse.  Call your doctor and go to the ER if you experience severe discomfort, chest pain, shortness of breath, fever greater than 100.4, of excessive vaginal bleeding greater than 1 pad/hr for 2 consecutive hours.  Take over the counter medications for pain control as directed. Follow up with your doctor in 2 weeks.

## 2023-06-01 NOTE — ASU PATIENT PROFILE, ADULT - FALL HARM RISK - UNIVERSAL INTERVENTIONS
Bed in lowest position, wheels locked, appropriate side rails in place/Call bell, personal items and telephone in reach/Instruct patient to call for assistance before getting out of bed or chair/Non-slip footwear when patient is out of bed/[place] to call system/Physically safe environment - no spills, clutter or unnecessary equipment/Purposeful Proactive Rounding/Room/bathroom lighting operational, light cord in reach

## 2023-06-01 NOTE — ASU DISCHARGE PLAN (ADULT/PEDIATRIC) - NURSING INSTRUCTIONS
OK to take Tylenol/Acetaminophen at 5'30pm for pain and every 6 hours after as needed. OK to take Motrin/Ibuprofen  for pain and every 6 hours after as needed.

## 2023-06-01 NOTE — ASU DISCHARGE PLAN (ADULT/PEDIATRIC) - CARE PROVIDER_API CALL
Flori Miranda  Obstetrics and Gynecology  [address] [facility], [address]  [place] [address]  Phone: [phone]  Fax: [phone]  Established Patient  Follow Up Time: 2 weeks

## 2023-06-05 ENCOUNTER — APPOINTMENT (OUTPATIENT)
Dept: ORTHOPEDIC SURGERY | Facility: CLINIC | Age: 58
End: 2023-06-05
Payer: COMMERCIAL

## 2023-06-05 VITALS
BODY MASS INDEX: 26.29 KG/M2 | WEIGHT: 154 LBS | HEIGHT: 64 IN | SYSTOLIC BLOOD PRESSURE: 112 MMHG | DIASTOLIC BLOOD PRESSURE: 70 MMHG

## 2023-06-05 PROBLEM — Z87.898 PERSONAL HISTORY OF OTHER SPECIFIED CONDITIONS: Chronic | Status: ACTIVE | Noted: 2023-05-26

## 2023-06-05 PROBLEM — T14.8XXA OTHER INJURY OF UNSPECIFIED BODY REGION, INITIAL ENCOUNTER: Chronic | Status: ACTIVE | Noted: 2023-05-26

## 2023-06-05 PROCEDURE — 99024 POSTOP FOLLOW-UP VISIT: CPT

## 2023-06-05 NOTE — HISTORY OF PRESENT ILLNESS
[de-identified] : ORIF R distal radius fracture 03/30/23 [de-identified] : Patient has been working on therapy and making great progress\par She continues to wear her wrist brace outside of the home- counseled to try to wean out to allow for more wrist motion  [de-identified] : Incision c/d/i\par Makes full composite fist with strong \par 90 pronation, 85 supination\par Wrist flex-ex 30-45\par SILT throughout\par 2+ radial pulse [de-identified] : S/p ORIF R distal radius fracture [de-identified] : OT for ROM, edema control, progressive strengthening\par F/u in 4 weeks

## 2023-06-09 LAB — SURGICAL PATHOLOGY STUDY: SIGNIFICANT CHANGE UP

## 2023-07-03 ENCOUNTER — APPOINTMENT (OUTPATIENT)
Dept: ORTHOPEDIC SURGERY | Facility: CLINIC | Age: 58
End: 2023-07-03

## 2023-07-25 ENCOUNTER — APPOINTMENT (OUTPATIENT)
Dept: NEUROLOGY | Facility: CLINIC | Age: 58
End: 2023-07-25

## 2024-05-03 NOTE — ASU DISCHARGE PLAN (ADULT/PEDIATRIC) - C. MAKE IMPORTANT PERSONAL OR BUSINESS DECISIONS
Alert and oriented to person, place, time/situation. normal mood and affect. no apparent risk to self or others.
Statement Selected
- seen on telemetry, no CP, no palpitations, HD stable   - cardiology recommended outpatient titration of metoprolol; patient is asymptomatic.  -TTE shows mild segmental LV systolic dysfunction and hypokinesis of inferolateral wall; cardiology recs reviewed, will c/w medical management  - metoprolol to 75 ER

## 2024-09-13 NOTE — ED ADULT TRIAGE NOTE - [facility] TRG CHIEF CMPLNT
Called pt to verify request. Pt has not picked up August supply at [facility]. Pt states insurance has been picky where script can be sent to. Pt requesting to the [facility]. Verified with pt how many pills left since mailorder does take time. Pt has 10 pills left.    Called [facility] and verified pt has not picked up August script.    Pharmacy attached to encounter and will route to provider if okay to send electronically.   fall

## (undated) DEVICE — FOLEY TRAY 16FR 5CC LTX UMETER CLOSED

## (undated) DEVICE — DRSG KLING 3"

## (undated) DEVICE — WARMING BLANKET LOWER ADULT

## (undated) DEVICE — MEDICATION LABELS W MARKER

## (undated) DEVICE — SOL IRR GLYCINE 1.5% 3000L

## (undated) DEVICE — SUT MONOCRYL 4-0 18" P-3

## (undated) DEVICE — SOL IRR POUR H2O 250ML

## (undated) DEVICE — DRSG COBAN 4"

## (undated) DEVICE — GLV 7 PROTEXIS (WHITE)

## (undated) DEVICE — WARMING BLANKET UPPER ADULT

## (undated) DEVICE — DRILL BIT MEDARTIS TWIST 2X40X91MM

## (undated) DEVICE — DRAPE TOWEL BLUE 17" X 24"

## (undated) DEVICE — SPECIMEN CONTAINER 100ML

## (undated) DEVICE — GOWN TRIMAX LG

## (undated) DEVICE — TOURNIQUET ESMARK 4"

## (undated) DEVICE — TUBING STRYKER HYSTEROSCOPY INFLOW OUTFLOW

## (undated) DEVICE — VENODYNE/SCD SLEEVE CALF LARGE

## (undated) DEVICE — PREP BETADINE SPONGE STICKS

## (undated) DEVICE — GLV 7.5 PROTEXIS (WHITE)

## (undated) DEVICE — TOURNIQUET CUFF 24" DUAL PORT DUAL BLADDER W PLC (BLACK)

## (undated) DEVICE — DRAPE C ARM MINI PACK FOR 6800

## (undated) DEVICE — SOL IRR POUR NS 0.9% 500ML

## (undated) DEVICE — BLADE SURGICAL #15 CARBON

## (undated) DEVICE — DRSG CAST PLASTER XFAST 3"

## (undated) DEVICE — POSITIONER FOAM EGG CRATE ULNAR 2PCS (PINK)

## (undated) DEVICE — PREP CHLORAPREP HI-LITE ORANGE 26ML

## (undated) DEVICE — DRSG CAST PLASTER 3" (BLUE)

## (undated) DEVICE — FOLEY TRAY 16FR LF URINE METER SURESTEP

## (undated) DEVICE — DRSG ACE BANDAGE 4" NS

## (undated) DEVICE — TUBING SUCTION 20FT

## (undated) DEVICE — TOURNIQUET CUFF 18" DUAL PORT DUAL BLADDER W PLC (BLACK)

## (undated) DEVICE — DRAPE LIGHT HANDLE COVER (BLUE)

## (undated) DEVICE — GLV 6.5 PROTEXIS (WHITE)

## (undated) DEVICE — PREP BETADINE KIT

## (undated) DEVICE — GLV 8.5 PROTEXIS (WHITE)

## (undated) DEVICE — SUT POLYSORB 3-0 18" P-12 UNDYED

## (undated) DEVICE — GLV 8 PROTEXIS (WHITE)

## (undated) DEVICE — PACK HAND

## (undated) DEVICE — DRSG TELFA 3 X 8

## (undated) DEVICE — DRAPE MAYO STAND 30"

## (undated) DEVICE — DRSG WEBRIL 4"